# Patient Record
Sex: FEMALE | Race: WHITE | NOT HISPANIC OR LATINO | Employment: PART TIME | URBAN - NONMETROPOLITAN AREA
[De-identification: names, ages, dates, MRNs, and addresses within clinical notes are randomized per-mention and may not be internally consistent; named-entity substitution may affect disease eponyms.]

---
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
## 2019-01-22 ENCOUNTER — HOSPITAL ENCOUNTER (EMERGENCY)
Facility: HOSPITAL | Age: 19
Discharge: HOME | End: 2019-01-22
Attending: EMERGENCY MEDICINE | Admitting: EMERGENCY MEDICINE

## 2019-01-22 ENCOUNTER — APPOINTMENT (OUTPATIENT)
Dept: CT IMAGING | Facility: HOSPITAL | Age: 19
End: 2019-01-22

## 2019-01-22 VITALS
HEART RATE: 84 BPM | WEIGHT: 280 LBS | RESPIRATION RATE: 18 BRPM | OXYGEN SATURATION: 100 % | HEIGHT: 72 IN | TEMPERATURE: 98.2 F | DIASTOLIC BLOOD PRESSURE: 87 MMHG | SYSTOLIC BLOOD PRESSURE: 112 MMHG | BODY MASS INDEX: 37.93 KG/M2

## 2019-01-22 DIAGNOSIS — R51.9 ACUTE NONINTRACTABLE HEADACHE, UNSPECIFIED HEADACHE TYPE: Primary | ICD-10-CM

## 2019-01-22 LAB
ALBUMIN SERPL-MCNC: 4.6 G/DL (ref 3.5–5)
ALBUMIN/GLOB SERPL: 1.4 G/DL (ref 1.5–2.5)
ALP SERPL-CCNC: 101 U/L (ref 35–104)
ALT SERPL W P-5'-P-CCNC: 37 U/L (ref 10–36)
ANION GAP SERPL CALCULATED.3IONS-SCNC: 10.8 MMOL/L (ref 3.6–11.2)
AST SERPL-CCNC: 25 U/L (ref 10–30)
BASOPHILS # BLD AUTO: 0.05 10*3/MM3 (ref 0–0.3)
BASOPHILS NFR BLD AUTO: 0.3 % (ref 0–2)
BILIRUB SERPL-MCNC: 0.2 MG/DL (ref 0.2–1.8)
BUN BLD-MCNC: 19 MG/DL (ref 7–21)
BUN/CREAT SERPL: 25.3 (ref 7–25)
CALCIUM SPEC-SCNC: 9.7 MG/DL (ref 7.7–10)
CHLORIDE SERPL-SCNC: 107 MMOL/L (ref 99–112)
CO2 SERPL-SCNC: 21.2 MMOL/L (ref 24.3–31.9)
CREAT BLD-MCNC: 0.75 MG/DL (ref 0.43–1.29)
DEPRECATED RDW RBC AUTO: 42.5 FL (ref 37–54)
EOSINOPHIL # BLD AUTO: 0.1 10*3/MM3 (ref 0–0.7)
EOSINOPHIL NFR BLD AUTO: 0.7 % (ref 0–5)
ERYTHROCYTE [DISTWIDTH] IN BLOOD BY AUTOMATED COUNT: 13.5 % (ref 11.5–14.5)
GFR SERPL CREATININE-BSD FRML MDRD: 99 ML/MIN/1.73
GLOBULIN UR ELPH-MCNC: 3.2 GM/DL
GLUCOSE BLD-MCNC: 77 MG/DL (ref 70–110)
HCG SERPL QL: NEGATIVE
HCT VFR BLD AUTO: 45.1 % (ref 37–47)
HGB BLD-MCNC: 15.3 G/DL (ref 12–16)
IMM GRANULOCYTES # BLD AUTO: 0.07 10*3/MM3 (ref 0–0.03)
IMM GRANULOCYTES NFR BLD AUTO: 0.5 % (ref 0–0.5)
LYMPHOCYTES # BLD AUTO: 6.05 10*3/MM3 (ref 1–3)
LYMPHOCYTES NFR BLD AUTO: 42.2 % (ref 21–51)
MCH RBC QN AUTO: 29.6 PG (ref 27–33)
MCHC RBC AUTO-ENTMCNC: 33.9 G/DL (ref 33–37)
MCV RBC AUTO: 87.2 FL (ref 80–94)
MONOCYTES # BLD AUTO: 1.07 10*3/MM3 (ref 0.1–0.9)
MONOCYTES NFR BLD AUTO: 7.5 % (ref 0–10)
NEUTROPHILS # BLD AUTO: 6.99 10*3/MM3 (ref 1.4–6.5)
NEUTROPHILS NFR BLD AUTO: 48.8 % (ref 30–70)
OSMOLALITY SERPL CALC.SUM OF ELEC: 278.6 MOSM/KG (ref 273–305)
PLATELET # BLD AUTO: 341 10*3/MM3 (ref 130–400)
PMV BLD AUTO: 11.5 FL (ref 6–10)
POTASSIUM BLD-SCNC: 3.5 MMOL/L (ref 3.5–5.3)
PROT SERPL-MCNC: 7.8 G/DL (ref 6–8)
RBC # BLD AUTO: 5.17 10*6/MM3 (ref 4.2–5.4)
SODIUM BLD-SCNC: 139 MMOL/L (ref 135–153)
WBC NRBC COR # BLD: 14.33 10*3/MM3 (ref 4.5–12.5)

## 2019-01-22 PROCEDURE — 80053 COMPREHEN METABOLIC PANEL: CPT | Performed by: NURSE PRACTITIONER

## 2019-01-22 PROCEDURE — 70450 CT HEAD/BRAIN W/O DYE: CPT

## 2019-01-22 PROCEDURE — 84703 CHORIONIC GONADOTROPIN ASSAY: CPT | Performed by: NURSE PRACTITIONER

## 2019-01-22 PROCEDURE — 25010000002 KETOROLAC TROMETHAMINE PER 15 MG: Performed by: PHYSICIAN ASSISTANT

## 2019-01-22 PROCEDURE — 25010000002 METOCLOPRAMIDE PER 10 MG: Performed by: PHYSICIAN ASSISTANT

## 2019-01-22 PROCEDURE — 70450 CT HEAD/BRAIN W/O DYE: CPT | Performed by: RADIOLOGY

## 2019-01-22 PROCEDURE — 96375 TX/PRO/DX INJ NEW DRUG ADDON: CPT

## 2019-01-22 PROCEDURE — 96374 THER/PROPH/DIAG INJ IV PUSH: CPT

## 2019-01-22 PROCEDURE — 85025 COMPLETE CBC W/AUTO DIFF WBC: CPT | Performed by: NURSE PRACTITIONER

## 2019-01-22 PROCEDURE — 96361 HYDRATE IV INFUSION ADD-ON: CPT

## 2019-01-22 PROCEDURE — 99283 EMERGENCY DEPT VISIT LOW MDM: CPT

## 2019-01-22 RX ORDER — KETOROLAC TROMETHAMINE 30 MG/ML
30 INJECTION, SOLUTION INTRAMUSCULAR; INTRAVENOUS ONCE
Status: COMPLETED | OUTPATIENT
Start: 2019-01-22 | End: 2019-01-22

## 2019-01-22 RX ORDER — METAXALONE 800 MG/1
800 TABLET ORAL 3 TIMES DAILY PRN
Qty: 15 TABLET | Refills: 0 | Status: SHIPPED | OUTPATIENT
Start: 2019-01-22 | End: 2022-12-21

## 2019-01-22 RX ORDER — SODIUM CHLORIDE 0.9 % (FLUSH) 0.9 %
10 SYRINGE (ML) INJECTION AS NEEDED
Status: DISCONTINUED | OUTPATIENT
Start: 2019-01-22 | End: 2019-01-22 | Stop reason: HOSPADM

## 2019-01-22 RX ORDER — METOCLOPRAMIDE HYDROCHLORIDE 5 MG/ML
10 INJECTION INTRAMUSCULAR; INTRAVENOUS ONCE
Status: COMPLETED | OUTPATIENT
Start: 2019-01-22 | End: 2019-01-22

## 2019-01-22 RX ORDER — IBUPROFEN 800 MG/1
800 TABLET ORAL EVERY 6 HOURS PRN
Qty: 30 TABLET | Refills: 0 | Status: SHIPPED | OUTPATIENT
Start: 2019-01-22 | End: 2022-12-21

## 2019-01-22 RX ORDER — ORPHENADRINE CITRATE 30 MG/ML
60 INJECTION INTRAMUSCULAR; INTRAVENOUS ONCE
Status: DISCONTINUED | OUTPATIENT
Start: 2019-01-22 | End: 2019-01-22 | Stop reason: HOSPADM

## 2019-01-22 RX ORDER — ACETAMINOPHEN 500 MG
1000 TABLET ORAL ONCE
Status: COMPLETED | OUTPATIENT
Start: 2019-01-22 | End: 2019-01-22

## 2019-01-22 RX ADMIN — METOCLOPRAMIDE 10 MG: 5 INJECTION, SOLUTION INTRAMUSCULAR; INTRAVENOUS at 04:01

## 2019-01-22 RX ADMIN — SODIUM CHLORIDE 1000 ML: 9 INJECTION, SOLUTION INTRAVENOUS at 01:36

## 2019-01-22 RX ADMIN — SODIUM CHLORIDE 1000 ML: 9 INJECTION, SOLUTION INTRAVENOUS at 03:58

## 2019-01-22 RX ADMIN — ACETAMINOPHEN 1000 MG: 500 TABLET ORAL at 02:37

## 2019-01-22 RX ADMIN — KETOROLAC TROMETHAMINE 30 MG: 30 INJECTION, SOLUTION INTRAMUSCULAR; INTRAVENOUS at 04:00
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
## 2019-09-18 ENCOUNTER — HOSPITAL ENCOUNTER (EMERGENCY)
Facility: HOSPITAL | Age: 19
Discharge: HOME | End: 2019-09-18
Admitting: FAMILY MEDICINE

## 2019-09-18 ENCOUNTER — APPOINTMENT (OUTPATIENT)
Dept: CT IMAGING | Facility: HOSPITAL | Age: 19
End: 2019-09-18

## 2019-09-18 VITALS
RESPIRATION RATE: 17 BRPM | HEIGHT: 72 IN | OXYGEN SATURATION: 98 % | TEMPERATURE: 98.2 F | DIASTOLIC BLOOD PRESSURE: 88 MMHG | SYSTOLIC BLOOD PRESSURE: 151 MMHG | BODY MASS INDEX: 39.68 KG/M2 | HEART RATE: 82 BPM | WEIGHT: 293 LBS

## 2019-09-18 DIAGNOSIS — N93.9 ABNORMAL UTERINE BLEEDING: Primary | ICD-10-CM

## 2019-09-18 LAB
ALBUMIN SERPL-MCNC: 4.32 G/DL (ref 3.5–5.2)
ALBUMIN/GLOB SERPL: 1.2 G/DL
ALP SERPL-CCNC: 85 U/L (ref 39–117)
ALT SERPL W P-5'-P-CCNC: 29 U/L (ref 1–33)
ANION GAP SERPL CALCULATED.3IONS-SCNC: 12.1 MMOL/L (ref 5–15)
AST SERPL-CCNC: 26 U/L (ref 1–32)
BACTERIA UR QL AUTO: ABNORMAL /HPF
BASOPHILS # BLD AUTO: 0.02 10*3/MM3 (ref 0–0.2)
BASOPHILS NFR BLD AUTO: 0.4 % (ref 0–1.5)
BILIRUB SERPL-MCNC: 0.3 MG/DL (ref 0.2–1.2)
BILIRUB UR QL STRIP: NEGATIVE
BUN BLD-MCNC: 12 MG/DL (ref 6–20)
BUN/CREAT SERPL: 17.1 (ref 7–25)
CALCIUM SPEC-SCNC: 9.3 MG/DL (ref 8.6–10.5)
CHLORIDE SERPL-SCNC: 107 MMOL/L (ref 98–107)
CLARITY UR: ABNORMAL
CO2 SERPL-SCNC: 22.9 MMOL/L (ref 22–29)
COLOR UR: YELLOW
CREAT BLD-MCNC: 0.7 MG/DL (ref 0.57–1)
DEPRECATED RDW RBC AUTO: 43.1 FL (ref 37–54)
EOSINOPHIL # BLD AUTO: 0.07 10*3/MM3 (ref 0–0.4)
EOSINOPHIL NFR BLD AUTO: 1.3 % (ref 0.3–6.2)
ERYTHROCYTE [DISTWIDTH] IN BLOOD BY AUTOMATED COUNT: 13.5 % (ref 12.3–15.4)
GFR SERPL CREATININE-BSD FRML MDRD: 107 ML/MIN/1.73
GLOBULIN UR ELPH-MCNC: 3.5 GM/DL
GLUCOSE BLD-MCNC: 86 MG/DL (ref 65–99)
GLUCOSE UR STRIP-MCNC: NEGATIVE MG/DL
HCG SERPL QL: NEGATIVE
HCT VFR BLD AUTO: 43.2 % (ref 34–46.6)
HGB BLD-MCNC: 14 G/DL (ref 12–15.9)
HGB UR QL STRIP.AUTO: ABNORMAL
HOLD SPECIMEN: NORMAL
HOLD SPECIMEN: NORMAL
HYALINE CASTS UR QL AUTO: ABNORMAL /LPF
IMM GRANULOCYTES # BLD AUTO: 0.01 10*3/MM3 (ref 0–0.05)
IMM GRANULOCYTES NFR BLD AUTO: 0.2 % (ref 0–0.5)
KETONES UR QL STRIP: NEGATIVE
LEUKOCYTE ESTERASE UR QL STRIP.AUTO: ABNORMAL
LIPASE SERPL-CCNC: 50 U/L (ref 13–60)
LYMPHOCYTES # BLD AUTO: 2.26 10*3/MM3 (ref 0.7–3.1)
LYMPHOCYTES NFR BLD AUTO: 41.6 % (ref 19.6–45.3)
MCH RBC QN AUTO: 28.5 PG (ref 26.6–33)
MCHC RBC AUTO-ENTMCNC: 32.4 G/DL (ref 31.5–35.7)
MCV RBC AUTO: 88 FL (ref 79–97)
MONOCYTES # BLD AUTO: 0.44 10*3/MM3 (ref 0.1–0.9)
MONOCYTES NFR BLD AUTO: 8.1 % (ref 5–12)
NEUTROPHILS # BLD AUTO: 2.63 10*3/MM3 (ref 1.7–7)
NEUTROPHILS NFR BLD AUTO: 48.4 % (ref 42.7–76)
NITRITE UR QL STRIP: NEGATIVE
PH UR STRIP.AUTO: <=5 [PH] (ref 5–8)
PLATELET # BLD AUTO: 264 10*3/MM3 (ref 140–450)
PMV BLD AUTO: 11.8 FL (ref 6–12)
POTASSIUM BLD-SCNC: 3.6 MMOL/L (ref 3.5–5.2)
PROT SERPL-MCNC: 7.8 G/DL (ref 6–8.5)
PROT UR QL STRIP: NEGATIVE
RBC # BLD AUTO: 4.91 10*6/MM3 (ref 3.77–5.28)
RBC # UR: ABNORMAL /HPF
REF LAB TEST METHOD: ABNORMAL
SODIUM BLD-SCNC: 142 MMOL/L (ref 136–145)
SP GR UR STRIP: >1.03 (ref 1–1.03)
SQUAMOUS #/AREA URNS HPF: ABNORMAL /HPF
UROBILINOGEN UR QL STRIP: ABNORMAL
WBC NRBC COR # BLD: 5.43 10*3/MM3 (ref 3.4–10.8)
WBC UR QL AUTO: ABNORMAL /HPF
WHOLE BLOOD HOLD SPECIMEN: NORMAL
WHOLE BLOOD HOLD SPECIMEN: NORMAL

## 2019-09-18 PROCEDURE — 84703 CHORIONIC GONADOTROPIN ASSAY: CPT | Performed by: EMERGENCY MEDICINE

## 2019-09-18 PROCEDURE — 81001 URINALYSIS AUTO W/SCOPE: CPT | Performed by: EMERGENCY MEDICINE

## 2019-09-18 PROCEDURE — 85025 COMPLETE CBC W/AUTO DIFF WBC: CPT | Performed by: EMERGENCY MEDICINE

## 2019-09-18 PROCEDURE — 99284 EMERGENCY DEPT VISIT MOD MDM: CPT

## 2019-09-18 PROCEDURE — 74176 CT ABD & PELVIS W/O CONTRAST: CPT | Performed by: RADIOLOGY

## 2019-09-18 PROCEDURE — 83690 ASSAY OF LIPASE: CPT | Performed by: EMERGENCY MEDICINE

## 2019-09-18 PROCEDURE — 80053 COMPREHEN METABOLIC PANEL: CPT | Performed by: EMERGENCY MEDICINE

## 2019-09-18 PROCEDURE — 74176 CT ABD & PELVIS W/O CONTRAST: CPT

## 2019-09-18 RX ORDER — SODIUM CHLORIDE 0.9 % (FLUSH) 0.9 %
10 SYRINGE (ML) INJECTION AS NEEDED
Status: DISCONTINUED | OUTPATIENT
Start: 2019-09-18 | End: 2019-09-19 | Stop reason: HOSPADM

## 2019-09-19 NOTE — ED PROVIDER NOTES
Subjective     Vaginal Bleeding   Quality:  Bright red  Severity:  Mild  Onset quality:  Sudden  Duration:  1 day  Timing:  Intermittent  Progression:  Improving  Chronicity:  New  Menstrual history:  Irregular  Possible pregnancy: no    Context: during urination    Relieved by:  None tried  Worsened by:  Nothing  Ineffective treatments:  None tried  Associated symptoms: abdominal pain    Associated symptoms: no dysuria, no fever and no nausea        Review of Systems   Constitutional: Negative for chills and fever.   HENT: Negative for congestion, ear pain and sore throat.    Respiratory: Negative for cough, shortness of breath and wheezing.    Cardiovascular: Negative for chest pain.   Gastrointestinal: Positive for abdominal pain. Negative for diarrhea, nausea and vomiting.   Genitourinary: Positive for vaginal bleeding. Negative for dysuria and flank pain.   Skin: Negative for rash.   Neurological: Negative for headaches.   Psychiatric/Behavioral: The patient is not nervous/anxious.    All other systems reviewed and are negative.      Past Medical History:   Diagnosis Date   • Kidney stones        Allergies   Allergen Reactions   • Augmentin [Amoxicillin-Pot Clavulanate]        Past Surgical History:   Procedure Laterality Date   • CYST REMOVAL      TAILBONE   • TONSILLECTOMY     • TYMPANOSTOMY TUBE PLACEMENT     • WISDOM TOOTH EXTRACTION         History reviewed. No pertinent family history.    Social History     Socioeconomic History   • Marital status: Single     Spouse name: Not on file   • Number of children: Not on file   • Years of education: Not on file   • Highest education level: Not on file   Tobacco Use   • Smoking status: Never Smoker   Substance and Sexual Activity   • Alcohol use: No   • Drug use: No   • Sexual activity: Defer           Objective   Physical Exam   Constitutional: She is oriented to person, place, and time. She appears well-developed and well-nourished.   HENT:   Head:  Normocephalic.   Mouth/Throat: Oropharynx is clear and moist.   Neck: Neck supple.   Cardiovascular: Normal rate and regular rhythm.   Pulmonary/Chest: Effort normal and breath sounds normal.   Abdominal: Soft. Bowel sounds are normal. There is no tenderness.   Genitourinary: There is no rash or tenderness on the right labia. There is no rash or tenderness on the left labia. Uterus is not tender. Cervix exhibits friability. Right adnexum displays no tenderness. Left adnexum displays no tenderness. No bleeding in the vagina.   Musculoskeletal: Normal range of motion.   Neurological: She is alert and oriented to person, place, and time.   Skin: Skin is warm and dry.   Psychiatric: She has a normal mood and affect. Her behavior is normal. Judgment and thought content normal.   Nursing note and vitals reviewed.      Procedures           ED Course                  MDM    Final diagnoses:   Abnormal uterine bleeding              Ivana Arita PA  09/24/19 0434

## 2019-11-11 ENCOUNTER — APPOINTMENT (OUTPATIENT)
Dept: CT IMAGING | Facility: HOSPITAL | Age: 19
End: 2019-11-11

## 2019-11-11 ENCOUNTER — HOSPITAL ENCOUNTER (EMERGENCY)
Facility: HOSPITAL | Age: 19
Discharge: HOME | End: 2019-11-11
Attending: FAMILY MEDICINE | Admitting: FAMILY MEDICINE

## 2019-11-11 VITALS
DIASTOLIC BLOOD PRESSURE: 79 MMHG | WEIGHT: 293 LBS | HEART RATE: 89 BPM | BODY MASS INDEX: 39.68 KG/M2 | OXYGEN SATURATION: 99 % | SYSTOLIC BLOOD PRESSURE: 141 MMHG | HEIGHT: 72 IN | TEMPERATURE: 98.1 F | RESPIRATION RATE: 17 BRPM

## 2019-11-11 DIAGNOSIS — N30.01 ACUTE CYSTITIS WITH HEMATURIA: Primary | ICD-10-CM

## 2019-11-11 LAB
ALBUMIN SERPL-MCNC: 4.34 G/DL (ref 3.5–5.2)
ALBUMIN/GLOB SERPL: 1.3 G/DL
ALP SERPL-CCNC: 92 U/L (ref 39–117)
ALT SERPL W P-5'-P-CCNC: 21 U/L (ref 1–33)
ANION GAP SERPL CALCULATED.3IONS-SCNC: 10.7 MMOL/L (ref 5–15)
AST SERPL-CCNC: 20 U/L (ref 1–32)
B-HCG UR QL: NEGATIVE
BACTERIA UR QL AUTO: ABNORMAL /HPF
BASOPHILS # BLD AUTO: 0.04 10*3/MM3 (ref 0–0.2)
BASOPHILS NFR BLD AUTO: 0.5 % (ref 0–1.5)
BILIRUB SERPL-MCNC: 0.3 MG/DL (ref 0.2–1.2)
BILIRUB UR QL STRIP: NEGATIVE
BUN BLD-MCNC: 11 MG/DL (ref 6–20)
BUN/CREAT SERPL: 14.5 (ref 7–25)
CALCIUM SPEC-SCNC: 9.6 MG/DL (ref 8.6–10.5)
CHLORIDE SERPL-SCNC: 105 MMOL/L (ref 98–107)
CLARITY UR: ABNORMAL
CO2 SERPL-SCNC: 26.3 MMOL/L (ref 22–29)
COLOR UR: YELLOW
CREAT BLD-MCNC: 0.76 MG/DL (ref 0.57–1)
DEPRECATED RDW RBC AUTO: 41.2 FL (ref 37–54)
DEVELOPER EXPIRATION DATE: NORMAL
DEVELOPER LOT NUMBER: NORMAL
EOSINOPHIL # BLD AUTO: 0.1 10*3/MM3 (ref 0–0.4)
EOSINOPHIL NFR BLD AUTO: 1.3 % (ref 0.3–6.2)
ERYTHROCYTE [DISTWIDTH] IN BLOOD BY AUTOMATED COUNT: 13.1 % (ref 12.3–15.4)
EXPIRATION DATE: NORMAL
FECAL OCCULT BLOOD SCREEN, POC: NEGATIVE
GFR SERPL CREATININE-BSD FRML MDRD: 96 ML/MIN/1.73
GLOBULIN UR ELPH-MCNC: 3.5 GM/DL
GLUCOSE BLD-MCNC: 81 MG/DL (ref 65–99)
GLUCOSE UR STRIP-MCNC: NEGATIVE MG/DL
HCT VFR BLD AUTO: 43.7 % (ref 34–46.6)
HGB BLD-MCNC: 14.4 G/DL (ref 12–15.9)
HGB UR QL STRIP.AUTO: ABNORMAL
HOLD SPECIMEN: NORMAL
HOLD SPECIMEN: NORMAL
HYALINE CASTS UR QL AUTO: ABNORMAL /LPF
IMM GRANULOCYTES # BLD AUTO: 0.02 10*3/MM3 (ref 0–0.05)
IMM GRANULOCYTES NFR BLD AUTO: 0.3 % (ref 0–0.5)
KETONES UR QL STRIP: ABNORMAL
LEUKOCYTE ESTERASE UR QL STRIP.AUTO: ABNORMAL
LYMPHOCYTES # BLD AUTO: 2.32 10*3/MM3 (ref 0.7–3.1)
LYMPHOCYTES NFR BLD AUTO: 30.6 % (ref 19.6–45.3)
Lab: NORMAL
MCH RBC QN AUTO: 28.7 PG (ref 26.6–33)
MCHC RBC AUTO-ENTMCNC: 33 G/DL (ref 31.5–35.7)
MCV RBC AUTO: 87.1 FL (ref 79–97)
MONOCYTES # BLD AUTO: 0.6 10*3/MM3 (ref 0.1–0.9)
MONOCYTES NFR BLD AUTO: 7.9 % (ref 5–12)
NEGATIVE CONTROL: NEGATIVE
NEUTROPHILS # BLD AUTO: 4.51 10*3/MM3 (ref 1.7–7)
NEUTROPHILS NFR BLD AUTO: 59.4 % (ref 42.7–76)
NITRITE UR QL STRIP: NEGATIVE
NRBC BLD AUTO-RTO: 0 /100 WBC (ref 0–0.2)
PH UR STRIP.AUTO: <=5 [PH] (ref 5–8)
PLATELET # BLD AUTO: 302 10*3/MM3 (ref 140–450)
PMV BLD AUTO: 11.2 FL (ref 6–12)
POSITIVE CONTROL: POSITIVE
POTASSIUM BLD-SCNC: 3.8 MMOL/L (ref 3.5–5.2)
PROT SERPL-MCNC: 7.8 G/DL (ref 6–8.5)
PROT UR QL STRIP: NEGATIVE
RBC # BLD AUTO: 5.02 10*6/MM3 (ref 3.77–5.28)
RBC # UR: ABNORMAL /HPF
REF LAB TEST METHOD: ABNORMAL
SODIUM BLD-SCNC: 142 MMOL/L (ref 136–145)
SP GR UR STRIP: 1.03 (ref 1–1.03)
SQUAMOUS #/AREA URNS HPF: ABNORMAL /HPF
UROBILINOGEN UR QL STRIP: ABNORMAL
WBC NRBC COR # BLD: 7.59 10*3/MM3 (ref 3.4–10.8)
WBC UR QL AUTO: ABNORMAL /HPF
WHOLE BLOOD HOLD SPECIMEN: NORMAL
WHOLE BLOOD HOLD SPECIMEN: NORMAL

## 2019-11-11 PROCEDURE — 0 IOVERSOL 68 % SOLUTION: Performed by: FAMILY MEDICINE

## 2019-11-11 PROCEDURE — 25010000002 CEFTRIAXONE: Performed by: FAMILY MEDICINE

## 2019-11-11 PROCEDURE — 96365 THER/PROPH/DIAG IV INF INIT: CPT

## 2019-11-11 PROCEDURE — 82270 OCCULT BLOOD FECES: CPT | Performed by: FAMILY MEDICINE

## 2019-11-11 PROCEDURE — 85025 COMPLETE CBC W/AUTO DIFF WBC: CPT | Performed by: FAMILY MEDICINE

## 2019-11-11 PROCEDURE — 80053 COMPREHEN METABOLIC PANEL: CPT | Performed by: FAMILY MEDICINE

## 2019-11-11 PROCEDURE — 74177 CT ABD & PELVIS W/CONTRAST: CPT | Performed by: RADIOLOGY

## 2019-11-11 PROCEDURE — 74177 CT ABD & PELVIS W/CONTRAST: CPT

## 2019-11-11 PROCEDURE — 81025 URINE PREGNANCY TEST: CPT | Performed by: FAMILY MEDICINE

## 2019-11-11 PROCEDURE — 99284 EMERGENCY DEPT VISIT MOD MDM: CPT

## 2019-11-11 PROCEDURE — 81001 URINALYSIS AUTO W/SCOPE: CPT | Performed by: FAMILY MEDICINE

## 2019-11-11 RX ORDER — ONDANSETRON 4 MG/1
4 TABLET, ORALLY DISINTEGRATING ORAL EVERY 6 HOURS PRN
Qty: 12 TABLET | Refills: 0 | Status: SHIPPED | OUTPATIENT
Start: 2019-11-11 | End: 2022-12-21

## 2019-11-11 RX ORDER — SODIUM CHLORIDE 0.9 % (FLUSH) 0.9 %
10 SYRINGE (ML) INJECTION AS NEEDED
Status: DISCONTINUED | OUTPATIENT
Start: 2019-11-11 | End: 2019-11-12 | Stop reason: HOSPADM

## 2019-11-11 RX ORDER — CEFDINIR 300 MG/1
300 CAPSULE ORAL 2 TIMES DAILY
Qty: 14 CAPSULE | Refills: 0 | Status: SHIPPED | OUTPATIENT
Start: 2019-11-11 | End: 2022-12-21

## 2019-11-11 RX ADMIN — CEFTRIAXONE 1 G: 1 INJECTION, POWDER, FOR SOLUTION INTRAMUSCULAR; INTRAVENOUS at 20:20

## 2019-11-11 RX ADMIN — SODIUM CHLORIDE 500 ML: 9 INJECTION, SOLUTION INTRAVENOUS at 21:15

## 2019-11-11 RX ADMIN — IOVERSOL 94 ML: 678 INJECTION INTRA-ARTERIAL; INTRAVENOUS at 21:43

## 2019-11-12 NOTE — ED PROVIDER NOTES
Subjective   19-year-old female presents the emergency room with complaints of abdominal pain that began yesterday.  Patient states that her pain is located in her lower abdomen and radiates into her back.  She states that she is felt pressure.  She states that she has noticed blood she feels it was in her urine.  She states though that she did start to have dark-colored stool.  She is unsure if if she is having blood in her urine or if she is having blood in her stool.  States her last bowel movement was just prior to arrival.  She denies diarrhea.  Denies any recent trauma.  She states that she has had no nausea or vomiting.  She denies fever chills.  She denies chest pain or shortness of breath.  She denies vaginal bleeding.  Due to symptoms patient came to emergency room for evaluation        Abdominal Pain   Pain location:  LLQ and RLQ  Pain quality: aching and cramping    Pain severity:  Moderate  Timing:  Intermittent  Relieved by:  Nothing  Worsened by:  Nothing  Associated symptoms: no belching, no chest pain, no chills, no constipation, no cough, no diarrhea, no fever, no flatus, no nausea, no shortness of breath and no vomiting        Review of Systems   Constitutional: Negative for chills and fever.   Respiratory: Negative for cough and shortness of breath.    Cardiovascular: Negative for chest pain.   Gastrointestinal: Positive for abdominal pain. Negative for constipation, diarrhea, flatus, nausea and vomiting.   All other systems reviewed and are negative.      Past Medical History:   Diagnosis Date   • Kidney stones        Allergies   Allergen Reactions   • Augmentin [Amoxicillin-Pot Clavulanate]        Past Surgical History:   Procedure Laterality Date   • CYST REMOVAL      TAILBONE   • TONSILLECTOMY     • TYMPANOSTOMY TUBE PLACEMENT     • WISDOM TOOTH EXTRACTION         History reviewed. No pertinent family history.    Social History     Socioeconomic History   • Marital status: Single     Spouse  name: Not on file   • Number of children: Not on file   • Years of education: Not on file   • Highest education level: Not on file   Tobacco Use   • Smoking status: Never Smoker   • Smokeless tobacco: Never Used   Substance and Sexual Activity   • Alcohol use: No   • Drug use: No   • Sexual activity: Yes     Partners: Male     Birth control/protection: None           Objective   Physical Exam   Constitutional: She is oriented to person, place, and time.   HENT:   Head: Normocephalic and atraumatic.   Mouth/Throat: Oropharynx is clear and moist.   Eyes: EOM are normal. Pupils are equal, round, and reactive to light. No scleral icterus.   Cardiovascular: Normal rate and regular rhythm.   No murmur heard.  2+ radial pulses 2+ dorsalis pedis pulse   Pulmonary/Chest: Effort normal and breath sounds normal. She has no wheezes. She has no rhonchi. She has no rales.   Abdominal: Soft. Bowel sounds are normal. There is no tenderness. There is no rigidity, no rebound, no guarding and no CVA tenderness.   Unable to appreciate organomegaly due to body habitus.   Genitourinary: Rectal exam shows no mass, no tenderness and guaiac negative stool.   Neurological: She is alert and oriented to person, place, and time.   No focal neurological deficits   Skin: Skin is warm and dry. Capillary refill takes less than 2 seconds.   No petechiae no bruising.   Psychiatric: She has a normal mood and affect.   Nursing note and vitals reviewed.      Procedures           ED Course  ED Course as of Nov 11 2157   Mon Nov 11, 2019 1952 Patient white blood cell count is unremarkable.  Patient letter lites are unremarkable.  Patient Hemoccult is negative.  Patient is noted to have small amount of leukocyte Estrace as well as 31-50 white blood cells as well as 2+ bacteria in urine.  Have ordered IV Rocephin.  Will obtain CT scan of abdomen pelvis with contrast.  [BB]   2112 Patient with slight elevation in heart rate with sitting to standing however  otherwise is unremarkable.  Orthostatics are unremarkable.  [BB]   3424 Patient currently stable.  Patient is noted to have UTI on urinalysis.  Have discussed her on her buttocks and need for follow-up with primary care provider.  Discussed warning signs of joint emergency room.  Patient is afebrile and nontoxic.  Prior to discharge.  Patient verbalized understanding.  [BB]      ED Course User Index  [BB] Timur Winn MD                  MDM  Number of Diagnoses or Management Options  Acute cystitis with hematuria: minor     Amount and/or Complexity of Data Reviewed  Clinical lab tests: ordered and reviewed  Tests in the radiology section of CPT®: ordered and reviewed    Risk of Complications, Morbidity, and/or Mortality  Presenting problems: moderate  Diagnostic procedures: moderate  Management options: moderate    Patient Progress  Patient progress: stable      Final diagnoses:   Acute cystitis with hematuria              Timur Winn MD  11/11/19 5603

## 2020-05-29 ENCOUNTER — TRANSCRIBE ORDERS (OUTPATIENT)
Dept: ADMINISTRATIVE | Facility: HOSPITAL | Age: 20
End: 2020-05-29

## 2020-05-29 DIAGNOSIS — Z01.818 PRE-OPERATIVE CLEARANCE: Primary | ICD-10-CM

## 2020-06-01 ENCOUNTER — LAB (OUTPATIENT)
Dept: LAB | Facility: HOSPITAL | Age: 20
End: 2020-06-01

## 2020-06-01 DIAGNOSIS — Z01.818 PRE-OPERATIVE CLEARANCE: ICD-10-CM

## 2020-06-01 LAB
REF LAB TEST METHOD: NORMAL
SARS-COV-2 RNA RESP QL NAA+PROBE: NOT DETECTED

## 2022-12-21 ENCOUNTER — CONSULT (OUTPATIENT)
Dept: ONCOLOGY | Facility: CLINIC | Age: 22
End: 2022-12-21

## 2022-12-21 VITALS
RESPIRATION RATE: 18 BRPM | HEIGHT: 68 IN | SYSTOLIC BLOOD PRESSURE: 135 MMHG | TEMPERATURE: 97.3 F | DIASTOLIC BLOOD PRESSURE: 89 MMHG | OXYGEN SATURATION: 98 % | HEART RATE: 88 BPM | WEIGHT: 293 LBS | BODY MASS INDEX: 44.41 KG/M2

## 2022-12-21 DIAGNOSIS — R79.89 ELEVATED FERRITIN LEVEL: Primary | ICD-10-CM

## 2022-12-21 LAB
BASOPHILS # BLD AUTO: 0.05 10*3/MM3 (ref 0–0.2)
BASOPHILS NFR BLD AUTO: 0.7 % (ref 0–1.5)
DEPRECATED RDW RBC AUTO: 43.3 FL (ref 37–54)
EOSINOPHIL # BLD AUTO: 0.11 10*3/MM3 (ref 0–0.4)
EOSINOPHIL NFR BLD AUTO: 1.6 % (ref 0.3–6.2)
ERYTHROCYTE [DISTWIDTH] IN BLOOD BY AUTOMATED COUNT: 13.1 % (ref 12.3–15.4)
FERRITIN SERPL-MCNC: 210.1 NG/ML (ref 13–150)
HCT VFR BLD AUTO: 42.9 % (ref 34–46.6)
HGB BLD-MCNC: 14 G/DL (ref 12–15.9)
IMM GRANULOCYTES # BLD AUTO: 0.02 10*3/MM3 (ref 0–0.05)
IMM GRANULOCYTES NFR BLD AUTO: 0.3 % (ref 0–0.5)
IRON 24H UR-MRATE: 57 MCG/DL (ref 37–145)
IRON SATN MFR SERPL: 14 % (ref 20–50)
LYMPHOCYTES # BLD AUTO: 2.38 10*3/MM3 (ref 0.7–3.1)
LYMPHOCYTES NFR BLD AUTO: 35.2 % (ref 19.6–45.3)
MCH RBC QN AUTO: 29.5 PG (ref 26.6–33)
MCHC RBC AUTO-ENTMCNC: 32.6 G/DL (ref 31.5–35.7)
MCV RBC AUTO: 90.5 FL (ref 79–97)
MONOCYTES # BLD AUTO: 0.43 10*3/MM3 (ref 0.1–0.9)
MONOCYTES NFR BLD AUTO: 6.4 % (ref 5–12)
NEUTROPHILS NFR BLD AUTO: 3.78 10*3/MM3 (ref 1.7–7)
NEUTROPHILS NFR BLD AUTO: 55.8 % (ref 42.7–76)
NRBC BLD AUTO-RTO: 0 /100 WBC (ref 0–0.2)
PLATELET # BLD AUTO: 267 10*3/MM3 (ref 140–450)
PMV BLD AUTO: 10.8 FL (ref 6–12)
RBC # BLD AUTO: 4.74 10*6/MM3 (ref 3.77–5.28)
TIBC SERPL-MCNC: 396 MCG/DL (ref 298–536)
TRANSFERRIN SERPL-MCNC: 266 MG/DL (ref 200–360)
WBC NRBC COR # BLD: 6.77 10*3/MM3 (ref 3.4–10.8)

## 2022-12-21 PROCEDURE — 81256 HFE GENE: CPT | Performed by: INTERNAL MEDICINE

## 2022-12-21 PROCEDURE — 85025 COMPLETE CBC W/AUTO DIFF WBC: CPT | Performed by: INTERNAL MEDICINE

## 2022-12-21 PROCEDURE — 82728 ASSAY OF FERRITIN: CPT | Performed by: INTERNAL MEDICINE

## 2022-12-21 PROCEDURE — 83540 ASSAY OF IRON: CPT | Performed by: INTERNAL MEDICINE

## 2022-12-21 PROCEDURE — 99205 OFFICE O/P NEW HI 60 MIN: CPT | Performed by: INTERNAL MEDICINE

## 2022-12-21 PROCEDURE — 84466 ASSAY OF TRANSFERRIN: CPT | Performed by: INTERNAL MEDICINE

## 2022-12-21 NOTE — PROGRESS NOTES
"      Subjective     Date: 12/21/2022    Referring Provider  Brad Stinson*    Chief Complaint  Hemochromatosis/elevated ferritin    Subjective          Angie Ding is a 22 y.o. female who presents today to [facility] for initial evaluation .    HPI:   22-year-old female with past medical history of endometriosis, polycystic ovarian syndrome who presents today due to persistently elevated.  She reports having elevated \"iron\" levels that were noted approximately a year ago, she was seen by a hematologist at that time and [place].  Repeat labs showed her iron level was normal, no further testing was done.  However, at her regular physicians appointments blood draws have shown persistently elevated ferritin level 165 on 3/2/2002 and 214 on 11/19/2002.     She denies ever having genetic testing for hemochromatosis.  No family history of blood disorders as far she is aware, no family history of malignancy.  She is not a diabetic, no history of heart failure, no history of liver dysfunction.  She does not take iron supplements, but does take prenatal vitamins daily.  Denies alcohol use.  Denies excessive consumption of red meats.    The following portions of the patient's history were reviewed and updated as appropriate: allergies, current medications, past family history, past medical history, past social history, past surgical history and problem list.    Objective     Objective     Allergy:   Allergies   Allergen Reactions   • Augmentin [Amoxicillin-Pot Clavulanate]         Current Medications:   Current Outpatient Medications   Medication Sig Dispense Refill   • clomiPHENE (CLOMID) 50 MG tablet      • metFORMIN (GLUCOPHAGE) 500 MG tablet metformin 500 mg tablet   Take 1 tablet 3 times a day by oral route with meals for 30 days.     • Prenatal MV-Min-Fe Fum-FA-DHA (PRENATAL 1 PO) Prenatal Formula-DHA 28 mg-800 mcg-200 mg capsule   Take 1 capsule every day " "by oral route as directed for 30 days.       No current facility-administered medications for this visit.       Past Medical History:  Past Medical History:   Diagnosis Date   • Kidney stones    • Ovarian cyst        Past Surgical History:  Past Surgical History:   Procedure Laterality Date   • CYST REMOVAL      TAILBONE   • TONSILLECTOMY     • TYMPANOSTOMY TUBE PLACEMENT     • WISDOM TOOTH EXTRACTION         Social History:  Social History     Socioeconomic History   • Marital status: Single   Tobacco Use   • Smoking status: Never   • Smokeless tobacco: Never   Substance and Sexual Activity   • Alcohol use: No   • Drug use: No   • Sexual activity: Yes     Partners: Male     Birth control/protection: None     Angie Ding  reports that she has never smoked. She has never used smokeless tobacco..      Family History:  History reviewed. No pertinent family history.    Review of Systems:  Review of Systems   Constitutional: Negative.    HENT: Negative.    Eyes: Negative.    Respiratory: Negative.    Cardiovascular: Negative.    Gastrointestinal: Negative.    Endocrine: Negative.    Genitourinary: Negative.    Musculoskeletal: Negative.    Skin: Negative.    Neurological: Negative.    Hematological: Negative.    Psychiatric/Behavioral: Negative.        Vital Signs:   /89   Pulse 88   Temp 97.3 °F (36.3 °C)   Resp 18   Ht 172.7 cm (68\")   Wt (!) 149 kg (328 lb)   SpO2 98%   BMI 49.87 kg/m²      Physical Exam:  Physical Exam  Constitutional:       Appearance: Normal appearance.   HENT:      Head: Normocephalic and atraumatic.      Mouth/Throat:      Mouth: Mucous membranes are moist.      Pharynx: Oropharynx is clear.   Eyes:      Extraocular Movements: Extraocular movements intact.      Pupils: Pupils are equal, round, and reactive to light.   Cardiovascular:      Rate and Rhythm: Normal rate and regular rhythm.      Heart sounds: No murmur heard.  Pulmonary:      Effort: Pulmonary effort is " "normal.      Breath sounds: Normal breath sounds.   Abdominal:      General: Abdomen is flat. There is no distension.      Palpations: Abdomen is soft. There is no mass.   Musculoskeletal:         General: No swelling. Normal range of motion.   Skin:     General: Skin is warm and dry.   Neurological:      General: No focal deficit present.      Mental Status: She is alert and oriented to person, place, and time.   Psychiatric:         Mood and Affect: Mood normal.         Behavior: Behavior normal.         PHQ-9 Score  PHQ-9 Total Score: 0     Pain Score  Vitals:    12/21/22 1102   BP: 135/89   Pulse: 88   Resp: 18   Temp: 97.3 °F (36.3 °C)   SpO2: 98%   Weight: (!) 149 kg (328 lb)   Height: 172.7 cm (68\")   PainSc: 0-No pain       ECOG score: 0             Lab Review  Lab Results   Component Value Date    WBC 6.77 12/21/2022    HGB 14.0 12/21/2022    HCT 42.9 12/21/2022    MCV 90.5 12/21/2022    RDW 13.1 12/21/2022     12/21/2022    NEUTRORELPCT 55.8 12/21/2022    LYMPHORELPCT 35.2 12/21/2022    MONORELPCT 6.4 12/21/2022    EOSRELPCT 1.6 12/21/2022    BASORELPCT 0.7 12/21/2022    NEUTROABS 3.78 12/21/2022    LYMPHSABS 2.38 12/21/2022       Lab Results   Component Value Date     11/11/2019    K 3.8 11/11/2019    CO2 26.3 11/11/2019     11/11/2019    BUN 11 11/11/2019    CREATININE 0.76 11/11/2019    EGFRIFNONA 96 11/11/2019    EGFRIFAFRI  06/17/2017      Comment:      Unable to calculate GFR, patient age <=18.    GLUCOSE 81 11/11/2019    CALCIUM 9.6 11/11/2019    ALKPHOS 92 11/11/2019    AST 20 11/11/2019    ALT 21 11/11/2019    BILITOT 0.3 11/11/2019    ALBUMIN 4.34 11/11/2019    PROTEINTOT 7.8 11/11/2019       Assessment / Plan         Assessment and Plan   22-year-old female who presents with persistently elevated ferritin level.    #Persistently elevated ferritin  -Unlikely to be due to hemochromatosis, primary or secondary.  However we will repeat CBC, iron studies, ferritin level, peripheral " smear  -Check hemochromatosis mutations  -Discussed avoiding alcohol use until we have all the studies to prove that she does not have a history of hemochromatosis.      Discussed possible differential diagnoses, testing, treatment, recommended non-pharmacological interventions, risks, warning signs to monitor for that would indicate need for follow-up in clinic or ER. If no improvement with these regimens or you have new or worsening symptoms follow-up. Patient verbalizes understanding and agreement with plan of care. Denies further needs or concerns.     Patient was given instructions and counseling regarding her condition and for health maintenance advised.    I spent 60 minutes with Angie Ding today.  In the office today, more than 50% of this time was spent face-to-face with her  in counseling / coordination of care, reviewing her interim medical history and counseling on the current treatment plan.  All questions were answered to her satisfaction.    BMI cannot be calculated due to outdated height or weight values.  Please input a current height/weight in Vitals and re-renter BMIFOLLOWUP in Note to pull in correct documentation based on BMI range.         Meds ordered during this visit  No orders of the defined types were placed in this encounter.      Visit Diagnoses    ICD-10-CM ICD-9-CM   1. Elevated ferritin level  R79.89 790.6       Follow Up   2 weeks      This document has been electronically signed by Abbie Vaughn MD   December 21, 2022 17:21 EST    Dictated Utilizing Dragon Dictation: Part of this note may be an electronic transcription/translation of spoken language to printed text using the Dragon Dictation System.

## 2022-12-22 LAB — REF LAB TEST METHOD: NORMAL

## 2022-12-28 LAB — HFE GENE MUT ANL BLD/T: NORMAL

## 2023-01-03 ENCOUNTER — PATIENT ROUNDING (BHMG ONLY) (OUTPATIENT)
Dept: ONCOLOGY | Facility: CLINIC | Age: 23
End: 2023-01-03
Payer: COMMERCIAL

## 2023-01-05 ENCOUNTER — TELEPHONE (OUTPATIENT)
Dept: ONCOLOGY | Facility: CLINIC | Age: 23
End: 2023-01-05
Payer: COMMERCIAL

## 2023-01-05 NOTE — TELEPHONE ENCOUNTER
Caller: YAS - [facility]    Relationship: Provider    Best call back number: [phone]    What is the best time to reach you: ASAP, AS SHE NEEDS INFO. TODAY.    Who are you requesting to speak with (clinical staff, provider,  specific staff member): DR. HOPSON'S NURSE    Do you know the name of the person who called:  YAS    What was the call regarding: YAS NEEDS SOME CLARIFICATION ON LAB RESULTS YOU ALL REQUESTED.    Do you require a callback: YES, PLEASE

## 2023-05-01 LAB
EXTERNAL ABO GROUPING: NORMAL
EXTERNAL ANTIBODY SCREEN: NEGATIVE
EXTERNAL HEPATITIS B SURFACE ANTIGEN: NEGATIVE
EXTERNAL RH FACTOR: POSITIVE
EXTERNAL RUBELLA QUALITATIVE: NORMAL
EXTERNAL SYPHILIS RPR SCREEN: NORMAL
HIV1 P24 AG SERPL QL IA: NORMAL

## 2023-08-03 ENCOUNTER — REFERRAL TRIAGE (OUTPATIENT)
Dept: LABOR AND DELIVERY | Facility: HOSPITAL | Age: 23
End: 2023-08-03
Payer: COMMERCIAL

## 2023-08-08 ENCOUNTER — PATIENT OUTREACH (OUTPATIENT)
Dept: LABOR AND DELIVERY | Facility: HOSPITAL | Age: 23
End: 2023-08-08
Payer: COMMERCIAL

## 2023-08-08 NOTE — OUTREACH NOTE
[facility]  Intake    Current Estimated Gestational Age: 26w4d    Intake Assessment      Flowsheet Row Responses   Best Method for Contacting Home   Currently Employed Yes   Able to keep appointments as scheduled Yes   Do you have a dentist? No   Resources Presently Utilizing: WIC (Women, Infant, Children)   Maternal Warning Signs Provided   Other: Provided   Other Education Smoking/Vaping Cessation, SNAP Benefits, HANDS, Housing Assistance, Substance Use Disorder Treatment, How to find a dentist, Transportation Assistance, How to find a pediatrician, WIC Benefits, How to find a primary care provider, Insurance benefits/Incentives, Mental Health Services            Learning Assessment      Flowsheet Row Responses   Relationship Patient   Does the learner have any barriers to learning? No Barriers   What is the preferred language of the learner for medical teaching? English   Is an  required? No   How does the learner prefer to learn new concepts? Listening, Reading                Referral submitted to the following resources (verbal consent received to submit demographic information):         Tobacco, Alcohol, and Drug History     reports that she has never smoked. She has never used smokeless tobacco.   reports no history of alcohol use.   reports no history of drug use.    Marissa Reynaga RN  Maternity Nurse Navigator    8/8/2023, 17:17 EDT        [facility]  Enrollment    Current Estimated Gestational Age: 26w4d    Questions/Answers      Flowsheet Row Responses   Would like to participate? Yes   Date of Intake Visit 08/08/23            Spoke with patient over the phone. Discussed community and insurance extra benefits. Pt reports she is enrolled in [place], plans to breast feed, and is having a boy.  Information for  sent by [employer] along with SDOH questionnaire. Pt denies any needs or concerns at this time.    Marissa Reynaga RN  Maternity Nurse Navigator    8/8/2023, 17:17  EDT

## 2023-08-08 NOTE — OUTREACH NOTE
[facility]  Unable to Reach       Questions/Answers      Flowsheet Row Responses   Pending Outreach Confirm Patient Interest   Call Attempt First   Outcome Left message   Next Call Attempt Date 08/10/23            Left message and sent MyChart welcome letter.    Marissa Reynaga RN  Maternity Nurse Navigator    8/8/2023, 16:41 EDT

## 2023-08-15 LAB
EXTERNAL CHLAMYDIA SCREEN: NEGATIVE
EXTERNAL GONORRHEA SCREEN: NEGATIVE

## 2023-09-21 ENCOUNTER — PATIENT OUTREACH (OUTPATIENT)
Dept: LABOR AND DELIVERY | Facility: HOSPITAL | Age: 23
End: 2023-09-21
Payer: COMMERCIAL

## 2023-09-21 NOTE — OUTREACH NOTE
[facility]  Unable to Reach       Questions/Answers      Flowsheet Row Responses   Pending Outreach Prenatal Check-in   Call Attempt First   Outcome Left message   Next Call Attempt Date 09/28/23            Left message with call back number.    Marissa Reynaga RN  Maternity Nurse Navigator    9/21/2023, 13:35 EDT

## 2023-09-28 ENCOUNTER — PATIENT OUTREACH (OUTPATIENT)
Dept: LABOR AND DELIVERY | Facility: HOSPITAL | Age: 23
End: 2023-09-28
Payer: COMMERCIAL

## 2023-09-28 NOTE — OUTREACH NOTE
[facility]  Unable to Reach       Questions/Answers      Flowsheet Row Responses   Pending Outreach Prenatal Check-in   Call Attempt Second   Outcome Left message   Next Call Attempt Date 10/06/23            Left message with call back number. MC information and EPDS sent by [employer].    Marissa Reynaga RN  Maternity Nurse Navigator    9/28/2023, 14:49 EDT

## 2023-10-06 ENCOUNTER — PATIENT OUTREACH (OUTPATIENT)
Dept: LABOR AND DELIVERY | Facility: HOSPITAL | Age: 23
End: 2023-10-06
Payer: COMMERCIAL

## 2023-10-06 NOTE — OUTREACH NOTE
[facility]  Unable to Reach       Questions/Answers      Flowsheet Row Responses   Pending Outreach Prenatal Check-in   Call Attempt Third   Outcome Left message   Next Call Attempt Date 10/27/23            Left message with call back number.    Marissa Reynaga RN  Maternity Nurse Navigator    10/6/2023, 16:03 EDT

## 2023-10-11 ENCOUNTER — HOSPITAL ENCOUNTER (OUTPATIENT)
Dept: RESPIRATORY THERAPY | Facility: HOSPITAL | Age: 23
Discharge: HOME | End: 2023-10-11
Admitting: OBSTETRICS & GYNECOLOGY
Payer: COMMERCIAL

## 2023-10-11 ENCOUNTER — TRANSCRIBE ORDERS (OUTPATIENT)
Dept: ADMINISTRATIVE | Facility: HOSPITAL | Age: 23
End: 2023-10-11
Payer: COMMERCIAL

## 2023-10-11 DIAGNOSIS — R00.0 RACING HEART BEAT: ICD-10-CM

## 2023-10-11 DIAGNOSIS — R00.0 RACING HEART BEAT: Primary | ICD-10-CM

## 2023-10-11 PROCEDURE — 93225 XTRNL ECG REC<48 HRS REC: CPT

## 2023-10-18 ENCOUNTER — HOSPITAL ENCOUNTER (OUTPATIENT)
Facility: HOSPITAL | Age: 23
Discharge: HOME | End: 2023-10-18
Attending: OBSTETRICS & GYNECOLOGY | Admitting: OBSTETRICS & GYNECOLOGY
Payer: COMMERCIAL

## 2023-10-18 ENCOUNTER — APPOINTMENT (OUTPATIENT)
Dept: ULTRASOUND IMAGING | Facility: HOSPITAL | Age: 23
End: 2023-10-18
Payer: COMMERCIAL

## 2023-10-18 VITALS
BODY MASS INDEX: 44.41 KG/M2 | WEIGHT: 293 LBS | OXYGEN SATURATION: 96 % | RESPIRATION RATE: 20 BRPM | TEMPERATURE: 97.9 F | HEART RATE: 98 BPM | SYSTOLIC BLOOD PRESSURE: 122 MMHG | HEIGHT: 68 IN | DIASTOLIC BLOOD PRESSURE: 58 MMHG

## 2023-10-18 LAB
ALBUMIN SERPL-MCNC: 3.5 G/DL (ref 3.5–5.2)
ALBUMIN/GLOB SERPL: 1.1 G/DL
ALP SERPL-CCNC: 96 U/L (ref 39–117)
ALT SERPL W P-5'-P-CCNC: 18 U/L (ref 1–33)
ANION GAP SERPL CALCULATED.3IONS-SCNC: 12 MMOL/L (ref 5–15)
AST SERPL-CCNC: 17 U/L (ref 1–32)
BASOPHILS # BLD AUTO: 0.04 10*3/MM3 (ref 0–0.2)
BASOPHILS NFR BLD AUTO: 0.4 % (ref 0–1.5)
BILIRUB SERPL-MCNC: 0.2 MG/DL (ref 0–1.2)
BUN SERPL-MCNC: 11 MG/DL (ref 6–20)
BUN/CREAT SERPL: 25 (ref 7–25)
CALCIUM SPEC-SCNC: 9.3 MG/DL (ref 8.6–10.5)
CHLORIDE SERPL-SCNC: 106 MMOL/L (ref 98–107)
CO2 SERPL-SCNC: 20 MMOL/L (ref 22–29)
CREAT SERPL-MCNC: 0.44 MG/DL (ref 0.57–1)
DEPRECATED RDW RBC AUTO: 46.8 FL (ref 37–54)
EGFRCR SERPLBLD CKD-EPI 2021: 138.7 ML/MIN/1.73
EOSINOPHIL # BLD AUTO: 0.1 10*3/MM3 (ref 0–0.4)
EOSINOPHIL NFR BLD AUTO: 1.1 % (ref 0.3–6.2)
ERYTHROCYTE [DISTWIDTH] IN BLOOD BY AUTOMATED COUNT: 14.6 % (ref 12.3–15.4)
GLOBULIN UR ELPH-MCNC: 3.1 GM/DL
GLUCOSE SERPL-MCNC: 114 MG/DL (ref 65–99)
HCT VFR BLD AUTO: 37.3 % (ref 34–46.6)
HGB BLD-MCNC: 12 G/DL (ref 12–15.9)
IMM GRANULOCYTES # BLD AUTO: 0.08 10*3/MM3 (ref 0–0.05)
IMM GRANULOCYTES NFR BLD AUTO: 0.9 % (ref 0–0.5)
LDH SERPL-CCNC: 131 U/L (ref 135–214)
LYMPHOCYTES # BLD AUTO: 1.38 10*3/MM3 (ref 0.7–3.1)
LYMPHOCYTES NFR BLD AUTO: 14.8 % (ref 19.6–45.3)
MCH RBC QN AUTO: 28.4 PG (ref 26.6–33)
MCHC RBC AUTO-ENTMCNC: 32.2 G/DL (ref 31.5–35.7)
MCV RBC AUTO: 88.2 FL (ref 79–97)
MONOCYTES # BLD AUTO: 0.57 10*3/MM3 (ref 0.1–0.9)
MONOCYTES NFR BLD AUTO: 6.1 % (ref 5–12)
NEUTROPHILS NFR BLD AUTO: 7.13 10*3/MM3 (ref 1.7–7)
NEUTROPHILS NFR BLD AUTO: 76.7 % (ref 42.7–76)
NRBC BLD AUTO-RTO: 0 /100 WBC (ref 0–0.2)
PLATELET # BLD AUTO: 227 10*3/MM3 (ref 140–450)
PMV BLD AUTO: 11.8 FL (ref 6–12)
POTASSIUM SERPL-SCNC: 3.7 MMOL/L (ref 3.5–5.2)
PROT SERPL-MCNC: 6.6 G/DL (ref 6–8.5)
RBC # BLD AUTO: 4.23 10*6/MM3 (ref 3.77–5.28)
SODIUM SERPL-SCNC: 138 MMOL/L (ref 136–145)
URATE SERPL-MCNC: 4.8 MG/DL (ref 2.4–5.7)
WBC NRBC COR # BLD: 9.3 10*3/MM3 (ref 3.4–10.8)

## 2023-10-18 PROCEDURE — 83615 LACTATE (LD) (LDH) ENZYME: CPT | Performed by: OBSTETRICS & GYNECOLOGY

## 2023-10-18 PROCEDURE — 84550 ASSAY OF BLOOD/URIC ACID: CPT | Performed by: OBSTETRICS & GYNECOLOGY

## 2023-10-18 PROCEDURE — G0463 HOSPITAL OUTPT CLINIC VISIT: HCPCS

## 2023-10-18 PROCEDURE — 76819 FETAL BIOPHYS PROFIL W/O NST: CPT

## 2023-10-18 PROCEDURE — 36415 COLL VENOUS BLD VENIPUNCTURE: CPT | Performed by: OBSTETRICS & GYNECOLOGY

## 2023-10-18 PROCEDURE — 80053 COMPREHEN METABOLIC PANEL: CPT | Performed by: OBSTETRICS & GYNECOLOGY

## 2023-10-18 PROCEDURE — 85025 COMPLETE CBC W/AUTO DIFF WBC: CPT | Performed by: OBSTETRICS & GYNECOLOGY

## 2023-10-18 PROCEDURE — 76819 FETAL BIOPHYS PROFIL W/O NST: CPT | Performed by: RADIOLOGY

## 2023-10-19 ENCOUNTER — HOSPITAL ENCOUNTER (INPATIENT)
Facility: HOSPITAL | Age: 23
LOS: 4 days | Discharge: HOME | End: 2023-10-23
Attending: OBSTETRICS & GYNECOLOGY | Admitting: OBSTETRICS & GYNECOLOGY
Payer: COMMERCIAL

## 2023-10-19 ENCOUNTER — HOSPITAL ENCOUNTER (OUTPATIENT)
Dept: LABOR AND DELIVERY | Facility: HOSPITAL | Age: 23
Discharge: HOME | End: 2023-10-19
Payer: COMMERCIAL

## 2023-10-19 DIAGNOSIS — O14.90 PRE-ECLAMPSIA AFFECTING PREGNANCY, ANTEPARTUM: ICD-10-CM

## 2023-10-19 PROBLEM — Z34.90 PREGNANCY: Status: ACTIVE | Noted: 2023-10-19

## 2023-10-19 LAB
ABO GROUP BLD: NORMAL
ABO GROUP BLD: NORMAL
AMPHET+METHAMPHET UR QL: NEGATIVE
AMPHETAMINES UR QL: NEGATIVE
BARBITURATES UR QL SCN: NEGATIVE
BASOPHILS # BLD AUTO: 0.03 10*3/MM3 (ref 0–0.2)
BASOPHILS NFR BLD AUTO: 0.3 % (ref 0–1.5)
BENZODIAZ UR QL SCN: NEGATIVE
BLD GP AB SCN SERPL QL: NEGATIVE
BUPRENORPHINE SERPL-MCNC: NEGATIVE NG/ML
CANNABINOIDS SERPL QL: NEGATIVE
COCAINE UR QL: NEGATIVE
DEPRECATED RDW RBC AUTO: 46.5 FL (ref 37–54)
EOSINOPHIL # BLD AUTO: 0.12 10*3/MM3 (ref 0–0.4)
EOSINOPHIL NFR BLD AUTO: 1.3 % (ref 0.3–6.2)
ERYTHROCYTE [DISTWIDTH] IN BLOOD BY AUTOMATED COUNT: 14.6 % (ref 12.3–15.4)
EXTERNAL GROUP B STREP ANTIGEN: NORMAL
FENTANYL UR-MCNC: NEGATIVE NG/ML
HCT VFR BLD AUTO: 37.3 % (ref 34–46.6)
HGB BLD-MCNC: 12.3 G/DL (ref 12–15.9)
IMM GRANULOCYTES # BLD AUTO: 0.05 10*3/MM3 (ref 0–0.05)
IMM GRANULOCYTES NFR BLD AUTO: 0.5 % (ref 0–0.5)
LYMPHOCYTES # BLD AUTO: 1.79 10*3/MM3 (ref 0.7–3.1)
LYMPHOCYTES NFR BLD AUTO: 19.4 % (ref 19.6–45.3)
MCH RBC QN AUTO: 28.9 PG (ref 26.6–33)
MCHC RBC AUTO-ENTMCNC: 33 G/DL (ref 31.5–35.7)
MCV RBC AUTO: 87.8 FL (ref 79–97)
METHADONE UR QL SCN: NEGATIVE
MONOCYTES # BLD AUTO: 0.41 10*3/MM3 (ref 0.1–0.9)
MONOCYTES NFR BLD AUTO: 4.4 % (ref 5–12)
NEUTROPHILS NFR BLD AUTO: 6.85 10*3/MM3 (ref 1.7–7)
NEUTROPHILS NFR BLD AUTO: 74.1 % (ref 42.7–76)
NRBC BLD AUTO-RTO: 0 /100 WBC (ref 0–0.2)
OPIATES UR QL: NEGATIVE
OXYCODONE UR QL SCN: NEGATIVE
PCP UR QL SCN: NEGATIVE
PLATELET # BLD AUTO: 249 10*3/MM3 (ref 140–450)
PMV BLD AUTO: 11.6 FL (ref 6–12)
RBC # BLD AUTO: 4.25 10*6/MM3 (ref 3.77–5.28)
RH BLD: POSITIVE
RH BLD: POSITIVE
T&S EXPIRATION DATE: NORMAL
TRICYCLICS UR QL SCN: NEGATIVE
WBC NRBC COR # BLD: 9.25 10*3/MM3 (ref 3.4–10.8)

## 2023-10-19 PROCEDURE — 86900 BLOOD TYPING SEROLOGIC ABO: CPT | Performed by: OBSTETRICS & GYNECOLOGY

## 2023-10-19 PROCEDURE — 86900 BLOOD TYPING SEROLOGIC ABO: CPT

## 2023-10-19 PROCEDURE — 25810000003 LACTATED RINGERS PER 1000 ML: Performed by: OBSTETRICS & GYNECOLOGY

## 2023-10-19 PROCEDURE — 25810000003 SODIUM CHLORIDE 0.9 % SOLUTION: Performed by: OBSTETRICS & GYNECOLOGY

## 2023-10-19 PROCEDURE — 25010000002 VANCOMYCIN 5 G RECONSTITUTED SOLUTION: Performed by: OBSTETRICS & GYNECOLOGY

## 2023-10-19 PROCEDURE — 86901 BLOOD TYPING SEROLOGIC RH(D): CPT

## 2023-10-19 PROCEDURE — 86901 BLOOD TYPING SEROLOGIC RH(D): CPT | Performed by: OBSTETRICS & GYNECOLOGY

## 2023-10-19 PROCEDURE — 80307 DRUG TEST PRSMV CHEM ANLYZR: CPT | Performed by: OBSTETRICS & GYNECOLOGY

## 2023-10-19 PROCEDURE — 85025 COMPLETE CBC W/AUTO DIFF WBC: CPT | Performed by: OBSTETRICS & GYNECOLOGY

## 2023-10-19 PROCEDURE — 86850 RBC ANTIBODY SCREEN: CPT | Performed by: OBSTETRICS & GYNECOLOGY

## 2023-10-19 RX ORDER — PRENATAL VIT NO.126/IRON/FOLIC 28MG-0.8MG
1 TABLET ORAL DAILY
COMMUNITY

## 2023-10-19 RX ORDER — MAGNESIUM HYDROXIDE 1200 MG/15ML
1000 LIQUID ORAL ONCE AS NEEDED
Status: DISCONTINUED | OUTPATIENT
Start: 2023-10-19 | End: 2023-10-21

## 2023-10-19 RX ORDER — SODIUM CHLORIDE, SODIUM LACTATE, POTASSIUM CHLORIDE, CALCIUM CHLORIDE 600; 310; 30; 20 MG/100ML; MG/100ML; MG/100ML; MG/100ML
125 INJECTION, SOLUTION INTRAVENOUS CONTINUOUS
Status: DISCONTINUED | OUTPATIENT
Start: 2023-10-19 | End: 2023-10-23 | Stop reason: HOSPADM

## 2023-10-19 RX ORDER — MISOPROSTOL 100 UG/1
25 TABLET ORAL EVERY 4 HOURS PRN
Status: DISCONTINUED | OUTPATIENT
Start: 2023-10-19 | End: 2023-10-20

## 2023-10-19 RX ORDER — ACETAMINOPHEN 325 MG/1
650 TABLET ORAL EVERY 4 HOURS PRN
Status: DISCONTINUED | OUTPATIENT
Start: 2023-10-19 | End: 2023-10-21

## 2023-10-19 RX ORDER — FEXOFENADINE HCL 180 MG/1
180 TABLET ORAL DAILY
Status: ON HOLD | COMMUNITY
End: 2023-10-19

## 2023-10-19 RX ORDER — FAMOTIDINE 10 MG/ML
20 INJECTION, SOLUTION INTRAVENOUS EVERY 12 HOURS PRN
Status: DISCONTINUED | OUTPATIENT
Start: 2023-10-19 | End: 2023-10-21

## 2023-10-19 RX ORDER — TERBUTALINE SULFATE 1 MG/ML
0.2 INJECTION, SOLUTION SUBCUTANEOUS AS NEEDED
Status: DISCONTINUED | OUTPATIENT
Start: 2023-10-19 | End: 2023-10-21

## 2023-10-19 RX ORDER — CLINDAMYCIN HYDROCHLORIDE 300 MG/1
300 CAPSULE ORAL 4 TIMES DAILY
Status: ON HOLD | COMMUNITY
End: 2023-10-19

## 2023-10-19 RX ORDER — VANCOMYCIN 2 GRAM/500 ML IN 0.9 % SODIUM CHLORIDE INTRAVENOUS
2000 EVERY 8 HOURS
Status: DISPENSED | OUTPATIENT
Start: 2023-10-19 | End: 2023-10-21

## 2023-10-19 RX ORDER — SODIUM CHLORIDE 9 MG/ML
40 INJECTION, SOLUTION INTRAVENOUS AS NEEDED
Status: DISCONTINUED | OUTPATIENT
Start: 2023-10-19 | End: 2023-10-21

## 2023-10-19 RX ORDER — OXYTOCIN/0.9 % SODIUM CHLORIDE 30/500 ML
2-20 PLASTIC BAG, INJECTION (ML) INTRAVENOUS
Status: DISCONTINUED | OUTPATIENT
Start: 2023-10-20 | End: 2023-10-19

## 2023-10-19 RX ORDER — FAMOTIDINE 20 MG/1
20 TABLET, FILM COATED ORAL EVERY 12 HOURS PRN
Status: DISCONTINUED | OUTPATIENT
Start: 2023-10-19 | End: 2023-10-21

## 2023-10-19 RX ORDER — SODIUM CHLORIDE 0.9 % (FLUSH) 0.9 %
10 SYRINGE (ML) INJECTION EVERY 12 HOURS SCHEDULED
Status: DISCONTINUED | OUTPATIENT
Start: 2023-10-19 | End: 2023-10-21

## 2023-10-19 RX ORDER — SODIUM CHLORIDE 0.9 % (FLUSH) 0.9 %
10 SYRINGE (ML) INJECTION AS NEEDED
Status: DISCONTINUED | OUTPATIENT
Start: 2023-10-19 | End: 2023-10-21

## 2023-10-19 RX ORDER — ONDANSETRON 2 MG/ML
4 INJECTION INTRAMUSCULAR; INTRAVENOUS EVERY 6 HOURS PRN
Status: DISCONTINUED | OUTPATIENT
Start: 2023-10-19 | End: 2023-10-21

## 2023-10-19 RX ORDER — ONDANSETRON 4 MG/1
4 TABLET, FILM COATED ORAL EVERY 6 HOURS PRN
Status: DISCONTINUED | OUTPATIENT
Start: 2023-10-19 | End: 2023-10-21

## 2023-10-19 RX ORDER — BUTORPHANOL TARTRATE 1 MG/ML
1 INJECTION, SOLUTION INTRAMUSCULAR; INTRAVENOUS
Status: DISCONTINUED | OUTPATIENT
Start: 2023-10-19 | End: 2023-10-20

## 2023-10-19 RX ORDER — HYDROXYZINE HYDROCHLORIDE 25 MG/1
50 TABLET, FILM COATED ORAL NIGHTLY PRN
Status: DISCONTINUED | OUTPATIENT
Start: 2023-10-19 | End: 2023-10-21

## 2023-10-19 RX ORDER — OXYTOCIN/0.9 % SODIUM CHLORIDE 30/500 ML
2-20 PLASTIC BAG, INJECTION (ML) INTRAVENOUS
Status: DISCONTINUED | OUTPATIENT
Start: 2023-10-20 | End: 2023-10-21

## 2023-10-19 RX ORDER — FAMOTIDINE 20 MG/1
20 TABLET, FILM COATED ORAL 2 TIMES DAILY
Status: ON HOLD | COMMUNITY
End: 2023-10-19

## 2023-10-19 RX ORDER — LIDOCAINE HYDROCHLORIDE 10 MG/ML
0.5 INJECTION, SOLUTION INFILTRATION; PERINEURAL ONCE AS NEEDED
Status: DISCONTINUED | OUTPATIENT
Start: 2023-10-19 | End: 2023-10-21

## 2023-10-19 RX ORDER — PRENATAL VIT/IRON FUM/FOLIC AC 27MG-0.8MG
1 TABLET ORAL DAILY
Status: DISCONTINUED | OUTPATIENT
Start: 2023-10-21 | End: 2023-10-23 | Stop reason: HOSPADM

## 2023-10-19 RX ADMIN — VANCOMYCIN HYDROCHLORIDE 2000 MG: 5 INJECTION, POWDER, LYOPHILIZED, FOR SOLUTION INTRAVENOUS at 23:02

## 2023-10-19 RX ADMIN — SODIUM CHLORIDE, POTASSIUM CHLORIDE, SODIUM LACTATE AND CALCIUM CHLORIDE 125 ML/HR: 600; 310; 30; 20 INJECTION, SOLUTION INTRAVENOUS at 22:34

## 2023-10-20 PROCEDURE — 59025 FETAL NON-STRESS TEST: CPT

## 2023-10-20 PROCEDURE — 25010000002 VANCOMYCIN 5 G RECONSTITUTED SOLUTION: Performed by: OBSTETRICS & GYNECOLOGY

## 2023-10-20 PROCEDURE — 25010000002 DIPHENHYDRAMINE PER 50 MG: Performed by: OBSTETRICS & GYNECOLOGY

## 2023-10-20 PROCEDURE — 25010000002 MORPHINE PER 10 MG: Performed by: OBSTETRICS & GYNECOLOGY

## 2023-10-20 PROCEDURE — 25810000003 SODIUM CHLORIDE 0.9 % SOLUTION: Performed by: OBSTETRICS & GYNECOLOGY

## 2023-10-20 PROCEDURE — 25810000003 LACTATED RINGERS PER 1000 ML: Performed by: OBSTETRICS & GYNECOLOGY

## 2023-10-20 RX ORDER — MORPHINE SULFATE 2 MG/ML
2 INJECTION, SOLUTION INTRAMUSCULAR; INTRAVENOUS
Status: DISCONTINUED | OUTPATIENT
Start: 2023-10-20 | End: 2023-10-23 | Stop reason: HOSPADM

## 2023-10-20 RX ORDER — MISOPROSTOL 100 UG/1
50 TABLET ORAL EVERY 6 HOURS PRN
Status: DISCONTINUED | OUTPATIENT
Start: 2023-10-20 | End: 2023-10-23 | Stop reason: HOSPADM

## 2023-10-20 RX ORDER — DIPHENHYDRAMINE HYDROCHLORIDE 50 MG/ML
25 INJECTION INTRAMUSCULAR; INTRAVENOUS EVERY 6 HOURS PRN
Status: DISCONTINUED | OUTPATIENT
Start: 2023-10-20 | End: 2023-10-23 | Stop reason: HOSPADM

## 2023-10-20 RX ADMIN — MORPHINE SULFATE 2 MG: 2 INJECTION, SOLUTION INTRAMUSCULAR; INTRAVENOUS at 14:17

## 2023-10-20 RX ADMIN — MISOPROSTOL 50 MCG: 100 TABLET ORAL at 13:37

## 2023-10-20 RX ADMIN — VANCOMYCIN HYDROCHLORIDE 2000 MG: 5 INJECTION, POWDER, LYOPHILIZED, FOR SOLUTION INTRAVENOUS at 15:07

## 2023-10-20 RX ADMIN — SODIUM CHLORIDE, POTASSIUM CHLORIDE, SODIUM LACTATE AND CALCIUM CHLORIDE 125 ML/HR: 600; 310; 30; 20 INJECTION, SOLUTION INTRAVENOUS at 13:54

## 2023-10-20 RX ADMIN — MISOPROSTOL 25 MCG: 100 TABLET ORAL at 07:45

## 2023-10-20 RX ADMIN — ACETAMINOPHEN 650 MG: 325 TABLET ORAL at 02:50

## 2023-10-20 RX ADMIN — HYDROXYZINE HYDROCHLORIDE 50 MG: 25 TABLET, FILM COATED ORAL at 00:10

## 2023-10-20 RX ADMIN — MISOPROSTOL 25 MCG: 100 TABLET ORAL at 00:08

## 2023-10-20 RX ADMIN — VANCOMYCIN HYDROCHLORIDE 2000 MG: 5 INJECTION, POWDER, LYOPHILIZED, FOR SOLUTION INTRAVENOUS at 08:31

## 2023-10-20 RX ADMIN — DIPHENHYDRAMINE HYDROCHLORIDE 25 MG: 50 INJECTION, SOLUTION INTRAMUSCULAR; INTRAVENOUS at 02:19

## 2023-10-20 RX ADMIN — MORPHINE SULFATE 2 MG: 2 INJECTION, SOLUTION INTRAMUSCULAR; INTRAVENOUS at 11:58

## 2023-10-20 RX ADMIN — SODIUM CHLORIDE, POTASSIUM CHLORIDE, SODIUM LACTATE AND CALCIUM CHLORIDE 125 ML/HR: 600; 310; 30; 20 INJECTION, SOLUTION INTRAVENOUS at 02:14

## 2023-10-20 RX ADMIN — MORPHINE SULFATE 2 MG: 2 INJECTION, SOLUTION INTRAMUSCULAR; INTRAVENOUS at 07:28

## 2023-10-20 NOTE — PAYOR COMM NOTE
"CONTACT:  DONALD METZGER, RN  UTILIZATION MANAGEMENT DEPT.   [facility]   [address] [facility], [address]   PHONE:  [phone]   FAX: [phone]       INPATIENT AUTH REQUEST      Anjali Erickson (22 y.o. Female)       Date of Birth   2000    Social Security Number       Address   [address] [facility] [address]    Home Phone   [phone]    MRN   [id number]       [employer]   [employer]    Marital Status   Single                            Admission Date   10/19/23    Admission Type   Elective    Admitting Provider   Mony Avina DO    Attending Provider   Mejia Harrell MD    Department, Room/Bed   [facility] LABOR DELIVERY, L228       Discharge Date       Discharge Disposition       Discharge Destination                                 Attending Provider: Mejia Harrell MD    Allergies: Augmentin [Amoxicillin-pot Clavulanate]    Isolation: None   Infection: None   Code Status: CPR    Ht: 172.7 cm (68\")   Wt: 163 kg (360 lb)    Admission Cmt: None   Principal Problem: Pregnancy [Z34.90]                   Active Insurance as of 10/19/2023       Primary Coverage       Payor Plan Insurance Group Employer/Plan Group    [employer] [place] [employer]ICA[place]        Payor Plan Address Payor Plan Phone Number Payor Plan Fax Number Effective Dates    [address] [address] [phone]  2017 - None Entered    [facility] [id number]         Subscriber Name Subscriber Birth Date Member ID       ANJALI ERICKSON 2000 [id number]                     Emergency Contacts        (Rel.) Home Phone Work Phone Mobile Phone    KeyonnaNatasha miranda (Other) [phone] -- --                 History & Physical        Mejia Harrell MD at 10/20/23 1430          Obstetric History and Physical    Chief Complaint   Patient presents with    Scheduled Induction     CYTOTEC INDUCTION       Subjective    Patient is a 22 y.o. female  currently at 37 weeks gestation who is " admitted for induction of labor secondary to preeclampsia. She feels fetal movement and denies headache, scotomata or shortness of breath.     Her prenatal care is complicated by  hypertension  pre-eclampsia.  Her previous obstetric/gynecological history is noted for is non-contributory.    The following portions of the patients history were reviewed and updated as appropriate: current medications, allergies, past medical history, past surgical history, past family history, past social history, and problem list .       Prenatal Information:   Maternal Prenatal Labs  Blood Type ABO Type   Date Value Ref Range Status   10/19/2023 B  Final      Rh Status RH type   Date Value Ref Range Status   10/19/2023 Positive  Final      Antibody Screen Antibody Screen   Date Value Ref Range Status   10/19/2023 Negative  Final      Rapid Urin Drug Screen Barbiturates Screen, Urine   Date Value Ref Range Status   10/19/2023 Negative Negative Final     Benzodiazepine Screen, Urine   Date Value Ref Range Status   10/19/2023 Negative Negative Final     Methadone Screen, Urine   Date Value Ref Range Status   10/19/2023 Negative Negative Final     Opiate Screen   Date Value Ref Range Status   10/19/2023 Negative Negative Final     THC, Screen, Urine   Date Value Ref Range Status   10/19/2023 Negative Negative Final     Cocaine Screen, Urine   Date Value Ref Range Status   10/19/2023 Negative Negative Final     Amphetamine Screen, Urine   Date Value Ref Range Status   10/19/2023 Negative Negative Final     Buprenorphine, Screen, Urine   Date Value Ref Range Status   10/19/2023 Negative Negative Final     Methamphetamine, Ur   Date Value Ref Range Status   10/19/2023 Negative Negative Final     Oxycodone Screen, Urine   Date Value Ref Range Status   10/19/2023 Negative Negative Final     Tricyclic Antidepressants Screen   Date Value Ref Range Status   10/19/2023 Negative Negative Final      Group B Strep Culture No results found for:  "\"GBSANTIGEN\"           External Prenatal Results       Pregnancy Outside Results - Transcribed From Office Records - See Scanned Records For Details       Test Value Date Time    ABO  A  10/14/21 1822    Rh  Positive  10/14/21 182    Antibody Screen  Negative  10/14/21 182       Negative  10/11/21 0715    Varicella IgG       Rubella ^ Immune  21     Hgb  9.8 g/dL 10/14/21 1822       9.7 g/dL 10/11/21 0715       11.4 g/dL 21 0505       12.5 g/dL 21 0909    Hct  30.7 % 10/14/21 1822       30.2 % 10/11/21 0715       34.4 % 21 0505       37.6 % 21 0909    Glucose Fasting GTT       Glucose Tolerance Test 1 hour ^ 114  21     Glucose Tolerance Test 3 hour       Gonorrhea (discrete) ^ NEG  21     Chlamydia (discrete) ^ NEG  21     RPR ^ Non-Reactive  21     VDRL       Syphilis Antibody       HBsAg ^ Negative  21     Herpes Simplex Virus PCR       Herpes Simplex VIrus Culture       HIV ^ Negative  21     Hep C RNA Quant PCR       Hep C Antibody       AFP       Group B Strep ^ NEG  21     GBS Susceptibility to Clindamycin       GBS Susceptibility to Erythromycin       Fetal Fibronectin       Genetic Testing, Maternal Blood                 Drug Screening       Test Value Date Time    Urine Drug Screen       Amphetamine Screen  Negative  18 1157    Barbiturate Screen  Negative  18 1157    Benzodiazepine Screen  Negative  18 1157    Methadone Screen  Negative  18 1157    Phencyclidine Screen  Negative  18 1157    Opiates Screen  Negative  18 1157    THC Screen  Negative  18 1157    Cocaine Screen       Propoxyphene Screen       Buprenorphine Screen  Negative  18 1157    Methamphetamine Screen       Oxycodone Screen  Negative  18 1157    Tricyclic Antidepressants Screen                 Legend    ^: Historical                              Past OB History:     OB History    Para Term  AB " Living   1 0 0 0 0 0   SAB IAB Ectopic Molar Multiple Live Births   0 0 0 0 0 0      # Outcome Date GA Lbr Armando/2nd Weight Sex Delivery Anes PTL Lv   1 Current                Past Medical History: Past Medical History:   Diagnosis Date    Kidney stones     Ovarian cyst       Past Surgical History Past Surgical History:   Procedure Laterality Date    CYST REMOVAL      TAILBONE    LAPAROSCOPIC CHOLECYSTECTOMY      TONSILLECTOMY      TYMPANOSTOMY TUBE PLACEMENT      WISDOM TOOTH EXTRACTION        Family History: Family History   Problem Relation Age of Onset    Hypertension Mother     Hyperlipidemia Mother     Nephrolithiasis Father     Thyroid disease Sister     Diabetes Paternal Aunt     Diabetes Paternal Grandmother       Social History:  reports that she has never smoked. She has never used smokeless tobacco.   reports no history of alcohol use.   reports no history of drug use.        Review of Systems        Objective    Vital Signs Range for the last 24 hours  Temperature: Temp:  [96.3 °F (35.7 °C)-98.6 °F (37 °C)] 98.6 °F (37 °C)   Temp Source: Temp src: Axillary   BP: BP: (115-137)/(55-75) 137/73   Pulse: Heart Rate:  [] 90   Respirations: Resp:  [18] 18   Weight: Weight:  [163 kg (360 lb)] 163 kg (360 lb)     Physical Examination:     Physical exam:    Constitutional:  Well-developed and well-nourished.  No respiratory distress.      HENT:  Head:  Normocephalic and atraumatic.  Mouth:  Moist mucous membranes.    Eyes:  Conjunctivae and EOM are normal.  No scleral icterus.    Neck:  Neck supple.  No JVD present.    Cardiovascular:  Normal rate, regular rhythm and normal heart sounds with no murmur. No edema.  Pulmonary/Chest:  No respiratory distress, no wheezes, no crackles, with normal breath sounds and good air movement.  Abdominal:  Soft.  FH 38 cm, FHR 130s category 1 tracing  Musculoskeletal:  No edema, no tenderness, and no deformity.  No red or swollen joints anywhere.    Neurological:  Alert and  "oriented to person, place, and time. No facial droop.  No slurred speech.   Skin:  Skin is warm and dry. No rash noted. No pallor.   Pelvic Exam: normal external female genitalia, v    Presentation: Cephalic Late entry, exam was done at 9:00 AM.    Cervix: Exam by:   Dr Harrell   Dilation:  2/60/-2   Effacement:     Station:  -2     Laboratory Results:   CBC and coagulation:  Results from last 7 days   Lab Units 10/19/23  2152 10/18/23  1049   WBC 10*3/mm3 9.25 9.30   HEMOGLOBIN g/dL 12.3 12.0   HEMATOCRIT % 37.3 37.3   MCV fL 87.8 88.2   MCHC g/dL 33.0 32.2   PLATELETS 10*3/mm3 249 227       Renal and electrolytes:  Results from last 7 days   Lab Units 10/18/23  1049   SODIUM mmol/L 138   POTASSIUM mmol/L 3.7   CHLORIDE mmol/L 106   CO2 mmol/L 20.0*   BUN mg/dL 11   CREATININE mg/dL 0.44*   CALCIUM mg/dL 9.3   GLUCOSE mg/dL 114*     Estimated Creatinine Clearance: 323.7 mL/min (A) (by C-G formula based on SCr of 0.44 mg/dL (L)).    Liver and pancreatic function:  Results from last 7 days   Lab Units 10/18/23  1049   ALBUMIN g/dL 3.5   BILIRUBIN mg/dL 0.2   ALK PHOS U/L 96   AST (SGOT) U/L 17   ALT (SGPT) U/L 18     Endocrine function:  No results found for: \"HGBA1C\"  Point of care bedside glucose levels:      No results found for: \"TSH\", \"FREET4\"  Cardiac:        Cultures:  Lab Results   Component Value Date    COLORU Yellow 11/11/2019    CLARITYU Cloudy (A) 11/11/2019    PHUR <=5.0 11/11/2019    GLUCOSEU Negative 11/11/2019    KETONESU Trace (A) 11/11/2019    BLOODU Moderate (2+) (A) 11/11/2019    NITRITEU Negative 11/11/2019    LEUKOCYTESUR Small (1+) (A) 11/11/2019    BILIRUBINUR Negative 11/11/2019    UROBILINOGEN 1.0 E.U./dL 11/11/2019    RBCUA 3-5 (A) 11/11/2019    WBCUA 31-50 (A) 11/11/2019    BACTERIA 2+ (A) 11/11/2019     Microbiology Results (last 10 days)       Procedure Component Value - Date/Time    Group B Streptococcus Culture - Swab, Vaginal/Rectum [[id number]] Resulted: 10/19/23    Lab Status: Final " result Specimen: Swab from Vaginal/Rectum Updated: 10/19/23 5305     External Strep Group B Ag Unknown            Lab Results   Component Value Date    PREGTESTUR Negative 11/11/2019     Pain Management Panel  More data may exist         Latest Ref Rng & Units 10/19/2023 6/17/2017   Pain Management Panel   Amphetamine, Urine Qual Negative Negative  Negative    Barbiturates Screen, Urine Negative Negative  Negative    Benzodiazepine Screen, Urine Negative Negative  Negative    Buprenorphine, Screen, Urine Negative Negative  Negative    Cocaine Screen, Urine Negative Negative  Negative    Fentanyl, Urine Negative Negative  -   Methadone Screen , Urine Negative Negative  Negative    Methamphetamine, Ur Negative Negative  -       Radiology Review:  US Fetal Biophysical Profile;Without Non-Stress Testing    Result Date: 10/18/2023    BPP 8 out of 8.   This report was finalized on 10/18/2023 1:57 PM by Dr. Osman Yanes MD.         Assessment & Plan      Pregnancy        Assessment:  1.  Intrauterine pregnancy at 40w0d weeks gestation with reassuring fetal status.    2.  Preeclampsia  3.  GBS status: negative.   Plan:  1. Continue with induction with pitocin.  AROM performed clear.   2. Plan of care has been reviewed with patient and she agreed with the plan of care. 3.  Risks, benefits of treatment plan have been discussed.  4.  All questions have been answered.    Electronically signed by Mejia Harrell MD at 10/20/23 1436       H&P signed by New Onbase, [place] at 10/20/23 0810         [Media Unavailable] Scan on 10/19/2023 by New Onbase, [place]: H&P, [facility], 10/19/2023          Electronically signed by New Onbase, [place] at 10/20/23 0810       Facility-Administered Medications as of 10/20/2023   Medication Dose Route Frequency Provider Last Rate Last Admin    acetaminophen (TYLENOL) tablet 650 mg  650 mg Oral Q4H PRN Jd Harris III, MD   650 mg at 10/20/23 0250    diphenhydrAMINE (BENADRYL)  injection 25 mg  25 mg Intravenous Q6H PRN Jd Harris III, MD   25 mg at 10/20/23 0219    famotidine (PEPCID) injection 20 mg  20 mg Intravenous Q12H PRN Jd Harris III, MD        Or    famotidine (PEPCID) tablet 20 mg  20 mg Oral Q12H PRN Jd Harris III, MD        hydrOXYzine (ATARAX) tablet 50 mg  50 mg Oral Nightly PRN Jd Harris III, MD   50 mg at 10/20/23 0010    lactated ringers bolus 1,000 mL  1,000 mL Intravenous Once PRN Jd Harris III, MD        lactated ringers infusion  125 mL/hr Intravenous Continuous Jd Harris III,  mL/hr at 10/20/23 1354 125 mL/hr at 10/20/23 1354    lidocaine (XYLOCAINE) 1 % injection 0.5 mL  0.5 mL Intradermal Once PRN Jd Harris III, MD        miSOPROStol (CYTOTEC) tablet 50 mcg  50 mcg Vaginal Q6H PRN Mejia Harrell MD   50 mcg at 10/20/23 1337    morphine injection 2 mg  2 mg Intravenous Q2H PRN Mejia Harrell MD   2 mg at 10/20/23 1417    ondansetron (ZOFRAN) tablet 4 mg  4 mg Oral Q6H PRN Jd Harris III, MD        Or    ondansetron (ZOFRAN) injection 4 mg  4 mg Intravenous Q6H PRN Jd Harris III, MD        oxytocin (PITOCIN) 30 units in 0.9% sodium chloride 500 mL (premix)  2-20 ruma-units/min Intravenous Titrated Mejia Harrell MD        Pharmacy to dose vancomycin   Does not apply Q12H PRN Jd Harris III, MD        [START ON 10/21/2023] prenatal vitamin tablet 1 tablet  1 tablet Oral Daily Mejia Harrell MD        sodium chloride (NS) irrigation solution 1,000 mL  1,000 mL Irrigation Once PRN Jd Harris III, MD        sodium chloride 0.9 % flush 10 mL  10 mL Intravenous Q12H Jd Harris III, MD        sodium chloride 0.9 % flush 10 mL  10 mL Intravenous PRN Jd Harris III, MD        sodium chloride 0.9 % infusion 40 mL  40 mL Intravenous PRN Jd Harris III, MD        terbutaline (BRETHINE) injection 0.2 mg  0.2 mg Subcutaneous PRN Jd Harris III, MD        vancomycin IVPB 2000 mg in 0.9% Sodium Chloride 500 mL  2,000  mg Intravenous Q8H Jd Harris III,  mL/hr at 10/20/23 0831 2,000 mg at 10/20/23 0831     Orders (all)        Start     Ordered    10/21/23 0900  prenatal vitamin tablet 1 tablet  Daily         10/19/23 2332    10/20/23 1245  miSOPROStol (CYTOTEC) tablet 50 mcg  Every 6 Hours PRN         10/20/23 1233    10/20/23 0730  oxytocin (PITOCIN) 30 units in 0.9% sodium chloride 500 mL (premix)  Titrated         10/19/23 2309    10/20/23 0700  oxytocin (PITOCIN) 30 units in 0.9% sodium chloride 500 mL (premix)  Titrated,   Status:  Discontinued         10/19/23 2139    10/20/23 0356  morphine injection 2 mg  Every 2 Hours PRN         10/20/23 0357    10/20/23 0202  diphenhydrAMINE (BENADRYL) injection 25 mg  Every 6 Hours PRN         10/20/23 0203    10/20/23 0000  Vital Signs q 4 while awake  Every 4 Hours      Comments: While the patient is awake.    10/19/23 2139    10/19/23 2300  vancomycin IVPB 2000 mg in 0.9% Sodium Chloride 500 mL  Every 8 Hours         10/19/23 2147    10/19/23 2230  sodium chloride 0.9 % flush 10 mL  Every 12 Hours Scheduled         10/19/23 2139    10/19/23 2230  lactated ringers infusion  Continuous         10/19/23 2139    10/19/23 2216  ABO RH Specimen Verification  STAT         10/19/23 2215    10/19/23 2206  Fentanyl, Urine - Urine, Clean Catch  Once         10/19/23 2205    10/19/23 2200  famotidine (PEPCID) injection 20 mg  Every 12 Hours PRN        See Hyperspace for full Linked Orders Report.    10/19/23 2139    10/19/23 2200  famotidine (PEPCID) tablet 20 mg  Every 12 Hours PRN        See Hyperspace for full Linked Orders Report.    10/19/23 2139    10/19/23 2200  hydrOXYzine (ATARAX) tablet 50 mg  Nightly PRN         10/19/23 2139    10/19/23 2137  miSOPROStol (CYTOTEC) tablet 25 mcg  Every 4 Hours PRN,   Status:  Discontinued         10/19/23 2139    10/19/23 2135  Pharmacy to dose vancomycin  Every 12 Hours PRN         10/19/23 2139    10/19/23 2130  Admit To Obstetrics  Inpatient  Once         10/19/23 2139    10/19/23 2130  Code Status and Medical Interventions:  Continuous         10/19/23 2139    10/19/23 2130  Obtain Informed Consent  Once         10/19/23 2139    10/19/23 2130  Place Sequential Compression Device  Once         10/19/23 2139    10/19/23 2130  Maintain Sequential Compression Device  Continuous         10/19/23 2139    10/19/23 2130  Vital Signs Per Hospital Policy  Per Hospital Policy         10/19/23 2139    10/19/23 2130  Confirm Presence of Amniotic Fluid if Patient Presents With SROM  Once         10/19/23 2139    10/19/23 2130  Mini-Prep Prior to Delivery  Once         10/19/23 2139    10/19/23 2130  Continuous Fetal Monitoring With NST on Admission and Prior to Initiation of Oxytocin.  Per Order Details        Comments: Continuous Fetal Monitoring With NST on Admission & Prior to Initiation of Oxytocin.    10/19/23 2139    10/19/23 2130  External Uterine Contraction Monitoring  Per Hospital Policy         10/19/23 2139    10/19/23 2130  Notify Provider (Specified)  Until Discontinued         10/19/23 2139    10/19/23 2130  Notify Provider of Tachysystole (Per Hospital Algorithm)  Until Discontinued         10/19/23 2139    10/19/23 2130  Notify Provider if Membranes Ruptured, Bleeding Greater Than 1 Pad Per Hour, Fetal Heart Tone Abnormality or Severe Pain  Until Discontinued         10/19/23 2139    10/19/23 2130  May Ambulate if Membranes Intact or Head Engaged With Ruptured BOW or Normal Tracing for 20 Minutes  Until Discontinued         10/19/23 2139    10/19/23 2130  Initiate Group Beta Strep (GBS) Prophylaxis Protocol, If Criteria Met  Continuous        Comments: NO TREATMENT RECOMMENDED IF: 1) Maternal GBS Status Known Negative 2) Scheduled  Birth With Intact Membranes, Not in Labor 3) Maternal GBS Status Unknown, No Risk Factors  TREAT WITH ANTIBIOTICS IF:  1) Maternal GBS Status Known Positive 2) Maternal GBS Status Unknown With Risk  Factors: a)  Previous Infant Affected By GBS Infection b) GBS Urinary Tract Infection (UTI) or Bacteriuria During Pregnancy c) Unexplained Maternal Fever (100.4F (38C) or Greater) During Labor d)  Prolonged Rupture of Membranes (18 or More Hours) e) Gestational Age Less Than 37 Weeks    10/19/23 2139    10/19/23 2130  Assess Need for Indwelling Urinary Catheter - Follow Removal Protocol  Continuous        Comments: Indwelling Urinary Catheter Removal Criteria  Discontinue Indwelling Urinary Catheter Unless One of the Following is Present:  Urinary Retention or Obstruction  Chronic Urinary Catheter Use  End of Life  Critical Illness with Strict I/O   Tract or Abdominal Surgery  Stage 3/4 Sacral / Perineal Wound  Required Activity Restriction: Trauma  Required Activity Restriction: Spine Surgery  If Patient is Being Followed by Urology Contact Them PRIOR to Removal  Do Not Remove Indwelling Urinary Catheter Order is Present with a CLINICAL REASON to Maintain the Catheter. Provider is Required to Include a Clinical Reason to Maintain a Urinary Catheter    Patient Admitted With Indwelling Urinary Catheter (Not Placed at [facility])  Assess for Continued Need & Document Medical Necessity  If Infection is Suspected, Contact the Provider       See Valeriece for full Linked Orders Report.    10/19/23 2139    10/19/23 2130  Urinary Catheter Care  Every Shift      See Valeriece for full Linked Orders Report.    10/19/23 2139    10/19/23 2130  NPO Diet NPO Type: Ice Chips  Diet Effective Now         10/19/23 2139    10/19/23 2130  CBC & Differential  STAT         10/19/23 2139    10/19/23 2130  Urine Drug Screen - Urine, Clean Catch  STAT         10/19/23 2139    10/19/23 2130  Type & Screen  STAT         10/19/23 2139    10/19/23 2130  Insert Peripheral IV  Once         10/19/23 2139    10/19/23 2130  Saline Lock & Maintain IV Access  Continuous         10/19/23 2139    10/19/23 2130  CBC Auto Differential   PROCEDURE ONCE         10/19/23 2139    10/19/23 2129  sodium chloride 0.9 % flush 10 mL  As Needed         10/19/23 2139    10/19/23 2129  sodium chloride 0.9 % infusion 40 mL  As Needed         10/19/23 2139    10/19/23 2129  lidocaine (XYLOCAINE) 1 % injection 0.5 mL  Once As Needed         10/19/23 2139    10/19/23 2129  lactated ringers bolus 1,000 mL  Once As Needed         10/19/23 2139    10/19/23 2129  acetaminophen (TYLENOL) tablet 650 mg  Every 4 Hours PRN         10/19/23 2139    10/19/23 2129  butorphanol (STADOL) injection 1 mg  Every 2 Hours PRN,   Status:  Discontinued         10/19/23 2139    10/19/23 2129  butorphanol (STADOL) injection 2 mg  Every 3 Hours PRN,   Status:  Discontinued         10/19/23 2139    10/19/23 2129  ondansetron (ZOFRAN) tablet 4 mg  Every 6 Hours PRN        See Hyperspace for full Linked Orders Report.    10/19/23 2139    10/19/23 2129  ondansetron (ZOFRAN) injection 4 mg  Every 6 Hours PRN        See Hyperspace for full Linked Orders Report.    10/19/23 2139    10/19/23 2129  terbutaline (BRETHINE) injection 0.2 mg  As Needed         10/19/23 2139    10/19/23 2129  sodium chloride (NS) irrigation solution 1,000 mL  Once As Needed         10/19/23 2139    10/19/23 0000  Group B Streptococcus Culture - Swab, Vaginal/Rectum        Comments: This is an external result entered through the Results Console.      10/19/23 2307    10/19/23 0000  Rubella Antibody, IgG        Comments: This is an external result entered through the Results Console.      10/19/23 2307    Unscheduled  Position Change - For Intra-Uterine Resusitation for Hypertonus, HyperStimulation or Non-Reassuring Fetal Status  As Needed       10/19/23 2139    Unscheduled  Insert Indwelling Urinary Catheter  As Needed      Comments: After epidural PRN.  Perform Nasal Decolonization all patients with castro cath   See Hyperspace for full Linked Orders Report.    10/19/23 2139    --  clindamycin (CLEOCIN) 300 MG capsule   4 Times Daily,   Status:  Discontinued         10/19/23 2127    --  famotidine (PEPCID) 20 MG tablet  2 Times Daily,   Status:  Discontinued         10/19/23 2128    --  fexofenadine (ALLEGRA) 180 MG tablet  Daily,   Status:  Discontinued         10/19/23 2128    --  prenatal vitamin (prenatal, CLASSIC, vitamin) tablet  Daily         10/19/23 2129    Signed and Held  Notify Provider (Specified)  Until Discontinued         Signed and Held    Signed and Held  Vital Signs Per Hospital Policy  Per Hospital Policy         Signed and Held    Signed and Held  Up as Tolerated  Until Discontinued         Signed and Held    Signed and Held  Fundal & Lochia Check  As Needed      Comments: Every 15 Minutes x4, Then Every 30 Minutes x2, Then Every Shift    Signed and Held    Signed and Held  Fundal & Lochia Check  Every Shift       Signed and Held    Signed and Held  Diet: Regular/House Diet; Texture: Regular Texture (IDDSI 7); Fluid Consistency: Thin (IDDSI 0)  Diet Effective Now         Signed and Held    Signed and Held  oxytocin (PITOCIN) 30 units in 0.9% sodium chloride 500 mL (premix)  Once        See Hyperspace for full Linked Orders Report.    Signed and Held    Signed and Held  oxytocin (PITOCIN) 30 units in 0.9% sodium chloride 500 mL (premix)  Continuous        See Hyperspace for full Linked Orders Report.    Signed and Held    Signed and Held  acetaminophen (TYLENOL) tablet 650 mg  Every 4 Hours PRN         Signed and Held    Signed and Held  ibuprofen (ADVIL,MOTRIN) tablet 800 mg  Every 8 Hours Scheduled         Signed and Held    Signed and Held  HYDROcodone-acetaminophen (NORCO) 5-325 MG per tablet 1 tablet  Every 4 Hours PRN         Signed and Held    Signed and Held  methylergonovine (METHERGINE) injection 200 mcg  Once As Needed         Signed and Held    Signed and Held  carboprost (HEMABATE) injection 250 mcg  As Needed         Signed and Held    Signed and Held  miSOPROStol (CYTOTEC) tablet 600 mcg  As  Needed         Signed and Held                  Physician Progress Notes (all)    No notes of this type exist for this encounter.       Consult Notes (all)    No notes of this type exist for this encounter.

## 2023-10-20 NOTE — PROGRESS NOTES
Patient has no complaint.   FHR 140s category 1 tracing.   No contractions in spite of cytotec.     Exam;   Vitals:    10/20/23 1533   BP:    Pulse: 103   Resp:    Temp:    SpO2: 95%      Cx: Closed/long/high  A/P:  Preeclampsia.     Plan of care:  I counseled her on the unfavorable cervix with associated preeclampsia and BMI of 54.74    I recommended  delivery. She prefers continue with induction of labor.

## 2023-10-20 NOTE — NON STRESS TEST
Rivertonalvaro Ding, a  at 36w6d with an GLORIA of 11/10/2023, by Last Menstrual Period, was seen at [facility] LABOR DELIVERY for a nonstress test.    Chief Complaint   Patient presents with    Scheduled Induction     CYTOTEC INDUCTION       Patient Active Problem List   Diagnosis    Pregnancy       Start Time:   Stop Time:     Interpretation A  Nonstress Test Interpretation A: Reactive  Comments A: VERIFIED BY ROCCO WEST RN

## 2023-10-20 NOTE — H&P
Obstetric History and Physical    Chief Complaint   Patient presents with   • Scheduled Induction     CYTOTEC INDUCTION       Subjective     Patient is a 22 y.o. female  currently at 37 weeks gestation who is admitted for induction of labor secondary to preeclampsia. She feels fetal movement and denies headache, scotomata or shortness of breath.     Her prenatal care is complicated by  hypertension  pre-eclampsia.  Her previous obstetric/gynecological history is noted for is non-contributory.    The following portions of the patients history were reviewed and updated as appropriate: current medications, allergies, past medical history, past surgical history, past family history, past social history, and problem list .       Prenatal Information:   Maternal Prenatal Labs  Blood Type ABO Type   Date Value Ref Range Status   10/19/2023 B  Final      Rh Status RH type   Date Value Ref Range Status   10/19/2023 Positive  Final      Antibody Screen Antibody Screen   Date Value Ref Range Status   10/19/2023 Negative  Final      Rapid Urin Drug Screen Barbiturates Screen, Urine   Date Value Ref Range Status   10/19/2023 Negative Negative Final     Benzodiazepine Screen, Urine   Date Value Ref Range Status   10/19/2023 Negative Negative Final     Methadone Screen, Urine   Date Value Ref Range Status   10/19/2023 Negative Negative Final     Opiate Screen   Date Value Ref Range Status   10/19/2023 Negative Negative Final     THC, Screen, Urine   Date Value Ref Range Status   10/19/2023 Negative Negative Final     Cocaine Screen, Urine   Date Value Ref Range Status   10/19/2023 Negative Negative Final     Amphetamine Screen, Urine   Date Value Ref Range Status   10/19/2023 Negative Negative Final     Buprenorphine, Screen, Urine   Date Value Ref Range Status   10/19/2023 Negative Negative Final     Methamphetamine, Ur   Date Value Ref Range Status   10/19/2023 Negative Negative Final     Oxycodone Screen, Urine   Date Value  "Ref Range Status   10/19/2023 Negative Negative Final     Tricyclic Antidepressants Screen   Date Value Ref Range Status   10/19/2023 Negative Negative Final      Group B Strep Culture No results found for: \"GBSANTIGEN\"           External Prenatal Results       Pregnancy Outside Results - Transcribed From Office Records - See Scanned Records For Details       Test Value Date Time    ABO  A  10/14/21 1822    Rh  Positive  10/14/21 1822    Antibody Screen  Negative  10/14/21 1822       Negative  10/11/21 0715    Varicella IgG       Rubella ^ Immune  04/08/21     Hgb  9.8 g/dL 10/14/21 1822       9.7 g/dL 10/11/21 0715       11.4 g/dL 04/21/21 0505       12.5 g/dL 02/23/21 0909    Hct  30.7 % 10/14/21 1822       30.2 % 10/11/21 0715       34.4 % 04/21/21 0505       37.6 % 02/23/21 0909    Glucose Fasting GTT       Glucose Tolerance Test 1 hour ^ 114  07/23/21     Glucose Tolerance Test 3 hour       Gonorrhea (discrete) ^ NEG  09/17/21     Chlamydia (discrete) ^ NEG  09/17/21     RPR ^ Non-Reactive  04/08/21     VDRL       Syphilis Antibody       HBsAg ^ Negative  04/08/21     Herpes Simplex Virus PCR       Herpes Simplex VIrus Culture       HIV ^ Negative  04/08/21     Hep C RNA Quant PCR       Hep C Antibody       AFP       Group B Strep ^ NEG  09/16/21     GBS Susceptibility to Clindamycin       GBS Susceptibility to Erythromycin       Fetal Fibronectin       Genetic Testing, Maternal Blood                 Drug Screening       Test Value Date Time    Urine Drug Screen       Amphetamine Screen  Negative  08/26/18 1157    Barbiturate Screen  Negative  08/26/18 1157    Benzodiazepine Screen  Negative  08/26/18 1157    Methadone Screen  Negative  08/26/18 1157    Phencyclidine Screen  Negative  08/26/18 1157    Opiates Screen  Negative  08/26/18 1157    THC Screen  Negative  08/26/18 1157    Cocaine Screen       Propoxyphene Screen       Buprenorphine Screen  Negative  08/26/18 1157    Methamphetamine Screen       " Oxycodone Screen  Negative  18 1157    Tricyclic Antidepressants Screen                 Legend    ^: Historical                              Past OB History:     OB History    Para Term  AB Living   1 0 0 0 0 0   SAB IAB Ectopic Molar Multiple Live Births   0 0 0 0 0 0      # Outcome Date GA Lbr Armando/2nd Weight Sex Delivery Anes PTL Lv   1 Current                Past Medical History: Past Medical History:   Diagnosis Date   • Kidney stones    • Ovarian cyst       Past Surgical History Past Surgical History:   Procedure Laterality Date   • CYST REMOVAL      TAILBONE   • LAPAROSCOPIC CHOLECYSTECTOMY     • TONSILLECTOMY     • TYMPANOSTOMY TUBE PLACEMENT     • WISDOM TOOTH EXTRACTION        Family History: Family History   Problem Relation Age of Onset   • Hypertension Mother    • Hyperlipidemia Mother    • Nephrolithiasis Father    • Thyroid disease Sister    • Diabetes Paternal Aunt    • Diabetes Paternal Grandmother       Social History:  reports that she has never smoked. She has never used smokeless tobacco.   reports no history of alcohol use.   reports no history of drug use.        Review of Systems   Constitutional:  Positive for fatigue.   Cardiovascular:  Positive for leg swelling.   Gastrointestinal:  Positive for abdominal pain.   Genitourinary:  Positive for pelvic pain.   Musculoskeletal:  Positive for back pain.   Neurological:  Positive for dizziness.   All other systems reviewed and are negative.          Objective     Vital Signs Range for the last 24 hours  Temperature: Temp:  [96.3 °F (35.7 °C)-98.6 °F (37 °C)] 98.6 °F (37 °C)   Temp Source: Temp src: Axillary   BP: BP: (115-137)/(55-75) 137/73   Pulse: Heart Rate:  [] 90   Respirations: Resp:  [18] 18   Weight: Weight:  [163 kg (360 lb)] 163 kg (360 lb)     Physical Examination:     Physical exam:    Constitutional:  Well-developed and well-nourished.  No respiratory distress.      HENT:  Head:  Normocephalic and  "atraumatic.  Mouth:  Moist mucous membranes.    Eyes:  Conjunctivae and EOM are normal.  No scleral icterus.    Neck:  Neck supple.  No JVD present.    Cardiovascular:  Normal rate, regular rhythm and normal heart sounds with no murmur. No edema.  Pulmonary/Chest:  No respiratory distress, no wheezes, no crackles, with normal breath sounds and good air movement.  Abdominal:  Soft.  FH 38 cm, FHR 130s category 1 tracing  Musculoskeletal:  No edema, no tenderness, and no deformity.  No red or swollen joints anywhere.    Neurological:  Alert and oriented to person, place, and time. No facial droop.  No slurred speech.   Skin:  Skin is warm and dry. No rash noted. No pallor.   Pelvic Exam: normal external female genitalia, v    Presentation: Cephalic Late entry, exam was done at 9:00 AM.    Cervix: Exam by:   Dr Harrell   Dilation:  2/60/-2   Effacement:     Station:  -2     Laboratory Results:   CBC and coagulation:  Results from last 7 days   Lab Units 10/19/23  2152 10/18/23  1049   WBC 10*3/mm3 9.25 9.30   HEMOGLOBIN g/dL 12.3 12.0   HEMATOCRIT % 37.3 37.3   MCV fL 87.8 88.2   MCHC g/dL 33.0 32.2   PLATELETS 10*3/mm3 249 227       Renal and electrolytes:  Results from last 7 days   Lab Units 10/18/23  1049   SODIUM mmol/L 138   POTASSIUM mmol/L 3.7   CHLORIDE mmol/L 106   CO2 mmol/L 20.0*   BUN mg/dL 11   CREATININE mg/dL 0.44*   CALCIUM mg/dL 9.3   GLUCOSE mg/dL 114*     Estimated Creatinine Clearance: 323.7 mL/min (A) (by C-G formula based on SCr of 0.44 mg/dL (L)).    Liver and pancreatic function:  Results from last 7 days   Lab Units 10/18/23  1049   ALBUMIN g/dL 3.5   BILIRUBIN mg/dL 0.2   ALK PHOS U/L 96   AST (SGOT) U/L 17   ALT (SGPT) U/L 18     Endocrine function:  No results found for: \"HGBA1C\"  Point of care bedside glucose levels:      No results found for: \"TSH\", \"FREET4\"  Cardiac:        Cultures:  Lab Results   Component Value Date    COLORU Yellow 11/11/2019    CLARITYU Cloudy (A) 11/11/2019    PHCHEKO " <=5.0 11/11/2019    GLUCOSEU Negative 11/11/2019    KETONESU Trace (A) 11/11/2019    BLOODU Moderate (2+) (A) 11/11/2019    NITRITEU Negative 11/11/2019    LEUKOCYTESUR Small (1+) (A) 11/11/2019    BILIRUBINUR Negative 11/11/2019    UROBILINOGEN 1.0 E.U./dL 11/11/2019    RBCUA 3-5 (A) 11/11/2019    WBCUA 31-50 (A) 11/11/2019    BACTERIA 2+ (A) 11/11/2019     Microbiology Results (last 10 days)       Procedure Component Value - Date/Time    Group B Streptococcus Culture - Swab, Vaginal/Rectum [[id number]] Resulted: 10/19/23    Lab Status: Final result Specimen: Swab from Vaginal/Rectum Updated: 10/19/23 2307     External Strep Group B Ag Unknown            Lab Results   Component Value Date    PREGTESTUR Negative 11/11/2019     Pain Management Panel  More data may exist         Latest Ref Rng & Units 10/19/2023 6/17/2017   Pain Management Panel   Amphetamine, Urine Qual Negative Negative  Negative    Barbiturates Screen, Urine Negative Negative  Negative    Benzodiazepine Screen, Urine Negative Negative  Negative    Buprenorphine, Screen, Urine Negative Negative  Negative    Cocaine Screen, Urine Negative Negative  Negative    Fentanyl, Urine Negative Negative  -   Methadone Screen , Urine Negative Negative  Negative    Methamphetamine, Ur Negative Negative  -       Radiology Review:  US Fetal Biophysical Profile;Without Non-Stress Testing    Result Date: 10/18/2023    BPP 8 out of 8.   This report was finalized on 10/18/2023 1:57 PM by Dr. Osman Yanes MD.         Assessment & Plan       Pregnancy        Assessment:  1.  Intrauterine pregnancy at 40w0d weeks gestation with reassuring fetal status.    2.  Preeclampsia  3.  GBS status: negative.   Plan:  1. Continue with induction with pitocin.  AROM performed clear.   2. Plan of care has been reviewed with patient and she agreed with the plan of care. 3.  Risks, benefits of treatment plan have been discussed.  4.  All questions have been answered.

## 2023-10-21 ENCOUNTER — ANESTHESIA EVENT (OUTPATIENT)
Dept: LABOR AND DELIVERY | Facility: HOSPITAL | Age: 23
End: 2023-10-21
Payer: COMMERCIAL

## 2023-10-21 ENCOUNTER — ANESTHESIA (OUTPATIENT)
Dept: LABOR AND DELIVERY | Facility: HOSPITAL | Age: 23
End: 2023-10-21
Payer: COMMERCIAL

## 2023-10-21 PROBLEM — O14.00 ANTEPARTUM MILD PREECLAMPSIA: Status: ACTIVE | Noted: 2023-10-21

## 2023-10-21 PROBLEM — O61.0 FAILED MEDICAL INDUCTION OF LABOR: Status: ACTIVE | Noted: 2023-10-21

## 2023-10-21 PROBLEM — Z3A.37 37 WEEKS GESTATION OF PREGNANCY: Status: ACTIVE | Noted: 2023-10-21

## 2023-10-21 PROCEDURE — 25010000002 BUPIVACAINE PF 0.75 % SOLUTION: Performed by: ANESTHESIOLOGY

## 2023-10-21 PROCEDURE — 25010000002 DEXAMETHASONE PER 1 MG: Performed by: ANESTHESIOLOGY

## 2023-10-21 PROCEDURE — 25010000002 ONDANSETRON PER 1 MG: Performed by: ANESTHESIOLOGY

## 2023-10-21 PROCEDURE — 59025 FETAL NON-STRESS TEST: CPT

## 2023-10-21 PROCEDURE — 25010000002 MORPHINE PER 10 MG: Performed by: ANESTHESIOLOGY

## 2023-10-21 PROCEDURE — 25010000002 CEFAZOLIN PER 500 MG: Performed by: OBSTETRICS & GYNECOLOGY

## 2023-10-21 PROCEDURE — 25010000002 OXYTOCIN PER 10 UNITS: Performed by: OBSTETRICS & GYNECOLOGY

## 2023-10-21 PROCEDURE — 25810000003 LACTATED RINGERS PER 1000 ML: Performed by: OBSTETRICS & GYNECOLOGY

## 2023-10-21 PROCEDURE — 25810000003 LACTATED RINGERS PER 1000 ML: Performed by: ANESTHESIOLOGY

## 2023-10-21 PROCEDURE — 25810000003 LACTATED RINGERS SOLUTION: Performed by: OBSTETRICS & GYNECOLOGY

## 2023-10-21 PROCEDURE — 25010000002 PROCHLORPERAZINE 10 MG/2ML SOLUTION: Performed by: ANESTHESIOLOGY

## 2023-10-21 PROCEDURE — 25810000003 SODIUM CHLORIDE 0.9 % SOLUTION: Performed by: OBSTETRICS & GYNECOLOGY

## 2023-10-21 RX ORDER — DEXMEDETOMIDINE HYDROCHLORIDE 4 UG/ML
INJECTION, SOLUTION INTRAVENOUS AS NEEDED
Status: DISCONTINUED | OUTPATIENT
Start: 2023-10-21 | End: 2023-10-21 | Stop reason: SURG

## 2023-10-21 RX ORDER — OXYTOCIN/0.9 % SODIUM CHLORIDE 30/500 ML
999 PLASTIC BAG, INJECTION (ML) INTRAVENOUS ONCE
Status: DISCONTINUED | OUTPATIENT
Start: 2023-10-21 | End: 2023-10-21 | Stop reason: HOSPADM

## 2023-10-21 RX ORDER — IBUPROFEN 600 MG/1
600 TABLET ORAL EVERY 6 HOURS
Status: DISCONTINUED | OUTPATIENT
Start: 2023-10-25 | End: 2023-10-23 | Stop reason: HOSPADM

## 2023-10-21 RX ORDER — ONDANSETRON 2 MG/ML
4 INJECTION INTRAMUSCULAR; INTRAVENOUS EVERY 6 HOURS PRN
Status: DISCONTINUED | OUTPATIENT
Start: 2023-10-21 | End: 2023-10-23 | Stop reason: HOSPADM

## 2023-10-21 RX ORDER — ONDANSETRON 2 MG/ML
4 INJECTION INTRAMUSCULAR; INTRAVENOUS EVERY 6 HOURS PRN
Status: DISCONTINUED | OUTPATIENT
Start: 2023-10-21 | End: 2023-10-21 | Stop reason: HOSPADM

## 2023-10-21 RX ORDER — PROMETHAZINE HYDROCHLORIDE 25 MG/1
12.5 TABLET ORAL EVERY 6 HOURS PRN
Status: DISCONTINUED | OUTPATIENT
Start: 2023-10-21 | End: 2023-10-21 | Stop reason: HOSPADM

## 2023-10-21 RX ORDER — OXYTOCIN/0.9 % SODIUM CHLORIDE 30/500 ML
PLASTIC BAG, INJECTION (ML) INTRAVENOUS CONTINUOUS PRN
Status: DISCONTINUED | OUTPATIENT
Start: 2023-10-21 | End: 2023-10-21 | Stop reason: SURG

## 2023-10-21 RX ORDER — MAGNESIUM HYDROXIDE 1200 MG/15ML
3000 LIQUID ORAL ONCE AS NEEDED
Status: DISCONTINUED | OUTPATIENT
Start: 2023-10-21 | End: 2023-10-21

## 2023-10-21 RX ORDER — HYDROXYZINE HYDROCHLORIDE 25 MG/1
50 TABLET, FILM COATED ORAL EVERY 6 HOURS PRN
Status: DISCONTINUED | OUTPATIENT
Start: 2023-10-21 | End: 2023-10-23 | Stop reason: HOSPADM

## 2023-10-21 RX ORDER — ACETAMINOPHEN 500 MG
1000 TABLET ORAL EVERY 6 HOURS
Status: DISPENSED | OUTPATIENT
Start: 2023-10-21 | End: 2023-10-22

## 2023-10-21 RX ORDER — ACETAMINOPHEN 500 MG
1000 TABLET ORAL ONCE
Status: COMPLETED | OUTPATIENT
Start: 2023-10-21 | End: 2023-10-21

## 2023-10-21 RX ORDER — IBUPROFEN 800 MG/1
800 TABLET ORAL EVERY 8 HOURS SCHEDULED
Status: DISCONTINUED | OUTPATIENT
Start: 2023-10-21 | End: 2023-10-21 | Stop reason: HOSPADM

## 2023-10-21 RX ORDER — MAGNESIUM HYDROXIDE 1200 MG/15ML
LIQUID ORAL AS NEEDED
Status: DISCONTINUED | OUTPATIENT
Start: 2023-10-21 | End: 2023-10-23 | Stop reason: HOSPADM

## 2023-10-21 RX ORDER — SODIUM CHLORIDE, SODIUM LACTATE, POTASSIUM CHLORIDE, CALCIUM CHLORIDE 600; 310; 30; 20 MG/100ML; MG/100ML; MG/100ML; MG/100ML
INJECTION, SOLUTION INTRAVENOUS CONTINUOUS PRN
Status: DISCONTINUED | OUTPATIENT
Start: 2023-10-21 | End: 2023-10-21 | Stop reason: SURG

## 2023-10-21 RX ORDER — MORPHINE SULFATE 0.5 MG/ML
INJECTION, SOLUTION EPIDURAL; INTRATHECAL; INTRAVENOUS AS NEEDED
Status: DISCONTINUED | OUTPATIENT
Start: 2023-10-21 | End: 2023-10-21 | Stop reason: SURG

## 2023-10-21 RX ORDER — BUPIVACAINE HYDROCHLORIDE 7.5 MG/ML
INJECTION, SOLUTION EPIDURAL; RETROBULBAR
Status: COMPLETED | OUTPATIENT
Start: 2023-10-21 | End: 2023-10-21

## 2023-10-21 RX ORDER — OXYTOCIN/0.9 % SODIUM CHLORIDE 30/500 ML
250 PLASTIC BAG, INJECTION (ML) INTRAVENOUS CONTINUOUS
Status: ACTIVE | OUTPATIENT
Start: 2023-10-21 | End: 2023-10-21

## 2023-10-21 RX ORDER — METHYLERGONOVINE MALEATE 0.2 MG/ML
200 INJECTION INTRAVENOUS ONCE AS NEEDED
Status: DISCONTINUED | OUTPATIENT
Start: 2023-10-21 | End: 2023-10-23 | Stop reason: HOSPADM

## 2023-10-21 RX ORDER — HYDROCORTISONE 25 MG/G
CREAM TOPICAL 3 TIMES DAILY PRN
Status: DISCONTINUED | OUTPATIENT
Start: 2023-10-21 | End: 2023-10-23 | Stop reason: HOSPADM

## 2023-10-21 RX ORDER — CARBOPROST TROMETHAMINE 250 UG/ML
250 INJECTION, SOLUTION INTRAMUSCULAR AS NEEDED
Status: DISCONTINUED | OUTPATIENT
Start: 2023-10-21 | End: 2023-10-21 | Stop reason: HOSPADM

## 2023-10-21 RX ORDER — METHYLERGONOVINE MALEATE 0.2 MG/ML
200 INJECTION INTRAVENOUS ONCE AS NEEDED
Status: DISCONTINUED | OUTPATIENT
Start: 2023-10-21 | End: 2023-10-21 | Stop reason: HOSPADM

## 2023-10-21 RX ORDER — MISOPROSTOL 100 UG/1
600 TABLET ORAL AS NEEDED
Status: DISCONTINUED | OUTPATIENT
Start: 2023-10-21 | End: 2023-10-21 | Stop reason: HOSPADM

## 2023-10-21 RX ORDER — PRENATAL VIT/IRON FUM/FOLIC AC 27MG-0.8MG
1 TABLET ORAL DAILY
Status: DISCONTINUED | OUTPATIENT
Start: 2023-10-22 | End: 2023-10-23 | Stop reason: HOSPADM

## 2023-10-21 RX ORDER — OXYCODONE HYDROCHLORIDE 5 MG/1
5 TABLET ORAL EVERY 4 HOURS PRN
Status: DISPENSED | OUTPATIENT
Start: 2023-10-21 | End: 2023-10-23

## 2023-10-21 RX ORDER — KETOROLAC TROMETHAMINE 30 MG/ML
30 INJECTION, SOLUTION INTRAMUSCULAR; INTRAVENOUS EVERY 6 HOURS PRN
Status: DISCONTINUED | OUTPATIENT
Start: 2023-10-21 | End: 2023-10-23 | Stop reason: HOSPADM

## 2023-10-21 RX ORDER — DIPHENHYDRAMINE HCL 25 MG
25 CAPSULE ORAL EVERY 4 HOURS PRN
Status: DISCONTINUED | OUTPATIENT
Start: 2023-10-21 | End: 2023-10-23 | Stop reason: HOSPADM

## 2023-10-21 RX ORDER — PROCHLORPERAZINE EDISYLATE 5 MG/ML
INJECTION INTRAMUSCULAR; INTRAVENOUS AS NEEDED
Status: DISCONTINUED | OUTPATIENT
Start: 2023-10-21 | End: 2023-10-21 | Stop reason: SURG

## 2023-10-21 RX ORDER — KETOROLAC TROMETHAMINE 30 MG/ML
30 INJECTION, SOLUTION INTRAMUSCULAR; INTRAVENOUS ONCE
Status: DISCONTINUED | OUTPATIENT
Start: 2023-10-21 | End: 2023-10-21 | Stop reason: HOSPADM

## 2023-10-21 RX ORDER — DIPHENHYDRAMINE HYDROCHLORIDE 50 MG/ML
25 INJECTION INTRAMUSCULAR; INTRAVENOUS EVERY 4 HOURS PRN
Status: DISCONTINUED | OUTPATIENT
Start: 2023-10-21 | End: 2023-10-23 | Stop reason: HOSPADM

## 2023-10-21 RX ORDER — ACETAMINOPHEN 325 MG/1
650 TABLET ORAL EVERY 4 HOURS PRN
Status: DISCONTINUED | OUTPATIENT
Start: 2023-10-21 | End: 2023-10-21 | Stop reason: HOSPADM

## 2023-10-21 RX ORDER — MISOPROSTOL 100 UG/1
600 TABLET ORAL ONCE AS NEEDED
Status: DISCONTINUED | OUTPATIENT
Start: 2023-10-21 | End: 2023-10-23 | Stop reason: HOSPADM

## 2023-10-21 RX ORDER — HYDROXYZINE HYDROCHLORIDE 25 MG/1
50 TABLET, FILM COATED ORAL NIGHTLY PRN
Status: DISCONTINUED | OUTPATIENT
Start: 2023-10-21 | End: 2023-10-21 | Stop reason: HOSPADM

## 2023-10-21 RX ORDER — ONDANSETRON 2 MG/ML
4 INJECTION INTRAMUSCULAR; INTRAVENOUS ONCE AS NEEDED
Status: DISCONTINUED | OUTPATIENT
Start: 2023-10-21 | End: 2023-10-23 | Stop reason: HOSPADM

## 2023-10-21 RX ORDER — HYDROCODONE BITARTRATE AND ACETAMINOPHEN 5; 325 MG/1; MG/1
1 TABLET ORAL EVERY 4 HOURS PRN
Status: DISCONTINUED | OUTPATIENT
Start: 2023-10-21 | End: 2023-10-21 | Stop reason: HOSPADM

## 2023-10-21 RX ORDER — OXYCODONE HYDROCHLORIDE 10 MG/1
10 TABLET ORAL EVERY 6 HOURS PRN
Status: DISPENSED | OUTPATIENT
Start: 2023-10-21 | End: 2023-10-23

## 2023-10-21 RX ORDER — ACETAMINOPHEN 325 MG/1
650 TABLET ORAL EVERY 6 HOURS
Status: DISCONTINUED | OUTPATIENT
Start: 2023-10-22 | End: 2023-10-23 | Stop reason: HOSPADM

## 2023-10-21 RX ORDER — ONDANSETRON 2 MG/ML
INJECTION INTRAMUSCULAR; INTRAVENOUS AS NEEDED
Status: DISCONTINUED | OUTPATIENT
Start: 2023-10-21 | End: 2023-10-21 | Stop reason: SURG

## 2023-10-21 RX ORDER — DEXAMETHASONE SODIUM PHOSPHATE 4 MG/ML
INJECTION, SOLUTION INTRA-ARTICULAR; INTRALESIONAL; INTRAMUSCULAR; INTRAVENOUS; SOFT TISSUE AS NEEDED
Status: DISCONTINUED | OUTPATIENT
Start: 2023-10-21 | End: 2023-10-21 | Stop reason: SURG

## 2023-10-21 RX ORDER — SIMETHICONE 80 MG
80 TABLET,CHEWABLE ORAL 4 TIMES DAILY
Status: DISCONTINUED | OUTPATIENT
Start: 2023-10-21 | End: 2023-10-23 | Stop reason: HOSPADM

## 2023-10-21 RX ORDER — DOCUSATE SODIUM 100 MG/1
100 CAPSULE, LIQUID FILLED ORAL 2 TIMES DAILY
Status: DISCONTINUED | OUTPATIENT
Start: 2023-10-22 | End: 2023-10-23 | Stop reason: HOSPADM

## 2023-10-21 RX ORDER — OXYTOCIN/0.9 % SODIUM CHLORIDE 30/500 ML
999 PLASTIC BAG, INJECTION (ML) INTRAVENOUS ONCE
Status: DISCONTINUED | OUTPATIENT
Start: 2023-10-21 | End: 2023-10-23 | Stop reason: HOSPADM

## 2023-10-21 RX ORDER — OXYTOCIN/0.9 % SODIUM CHLORIDE 30/500 ML
125 PLASTIC BAG, INJECTION (ML) INTRAVENOUS CONTINUOUS PRN
Status: DISCONTINUED | OUTPATIENT
Start: 2023-10-21 | End: 2023-10-23 | Stop reason: HOSPADM

## 2023-10-21 RX ORDER — PROMETHAZINE HYDROCHLORIDE 12.5 MG/1
12.5 SUPPOSITORY RECTAL EVERY 6 HOURS PRN
Status: DISCONTINUED | OUTPATIENT
Start: 2023-10-21 | End: 2023-10-21 | Stop reason: HOSPADM

## 2023-10-21 RX ORDER — METOCLOPRAMIDE HYDROCHLORIDE 5 MG/ML
10 INJECTION INTRAMUSCULAR; INTRAVENOUS EVERY 6 HOURS PRN
Status: DISCONTINUED | OUTPATIENT
Start: 2023-10-21 | End: 2023-10-21 | Stop reason: HOSPADM

## 2023-10-21 RX ORDER — PROCHLORPERAZINE EDISYLATE 5 MG/ML
10 INJECTION INTRAMUSCULAR; INTRAVENOUS EVERY 6 HOURS PRN
Status: DISCONTINUED | OUTPATIENT
Start: 2023-10-21 | End: 2023-10-23 | Stop reason: HOSPADM

## 2023-10-21 RX ORDER — ONDANSETRON 4 MG/1
4 TABLET, FILM COATED ORAL EVERY 8 HOURS PRN
Status: DISCONTINUED | OUTPATIENT
Start: 2023-10-21 | End: 2023-10-23 | Stop reason: HOSPADM

## 2023-10-21 RX ORDER — NALOXONE HCL 0.4 MG/ML
0.1 VIAL (ML) INJECTION
Status: ACTIVE | OUTPATIENT
Start: 2023-10-21 | End: 2023-10-22

## 2023-10-21 RX ORDER — ONDANSETRON 4 MG/1
4 TABLET, FILM COATED ORAL EVERY 6 HOURS PRN
Status: DISCONTINUED | OUTPATIENT
Start: 2023-10-21 | End: 2023-10-21 | Stop reason: HOSPADM

## 2023-10-21 RX ORDER — METHYLERGONOVINE MALEATE 0.2 MG/ML
200 INJECTION INTRAVENOUS AS NEEDED
Status: DISCONTINUED | OUTPATIENT
Start: 2023-10-21 | End: 2023-10-21 | Stop reason: HOSPADM

## 2023-10-21 RX ORDER — PHENYLEPHRINE HCL IN 0.9% NACL 1 MG/10 ML
SYRINGE (ML) INTRAVENOUS AS NEEDED
Status: DISCONTINUED | OUTPATIENT
Start: 2023-10-21 | End: 2023-10-21 | Stop reason: SURG

## 2023-10-21 RX ORDER — ONDANSETRON 4 MG/1
4 TABLET, FILM COATED ORAL EVERY 6 HOURS PRN
Status: DISCONTINUED | OUTPATIENT
Start: 2023-10-21 | End: 2023-10-23 | Stop reason: HOSPADM

## 2023-10-21 RX ORDER — MORPHINE SULFATE 2 MG/ML
2 INJECTION, SOLUTION INTRAMUSCULAR; INTRAVENOUS
Status: DISCONTINUED | OUTPATIENT
Start: 2023-10-21 | End: 2023-10-23 | Stop reason: HOSPADM

## 2023-10-21 RX ORDER — CARBOPROST TROMETHAMINE 250 UG/ML
250 INJECTION, SOLUTION INTRAMUSCULAR ONCE
Status: DISCONTINUED | OUTPATIENT
Start: 2023-10-21 | End: 2023-10-23 | Stop reason: HOSPADM

## 2023-10-21 RX ADMIN — Medication 250 ML/HR: at 10:32

## 2023-10-21 RX ADMIN — PROCHLORPERAZINE EDISYLATE 5 MG: 5 INJECTION INTRAMUSCULAR; INTRAVENOUS at 09:37

## 2023-10-21 RX ADMIN — ONDANSETRON 4 MG: 2 INJECTION INTRAMUSCULAR; INTRAVENOUS at 09:12

## 2023-10-21 RX ADMIN — Medication 999 ML/HR: at 10:00

## 2023-10-21 RX ADMIN — ONDANSETRON 4 MG: 2 INJECTION INTRAMUSCULAR; INTRAVENOUS at 09:37

## 2023-10-21 RX ADMIN — SIMETHICONE 80 MG: 80 TABLET, CHEWABLE ORAL at 17:59

## 2023-10-21 RX ADMIN — CEFAZOLIN 3000 MG: 1 INJECTION, POWDER, FOR SOLUTION INTRAMUSCULAR; INTRAVENOUS; PARENTERAL at 08:50

## 2023-10-21 RX ADMIN — Medication 100 MCG: at 09:01

## 2023-10-21 RX ADMIN — ACETAMINOPHEN 1000 MG: 500 TABLET ORAL at 08:27

## 2023-10-21 RX ADMIN — SODIUM CHLORIDE, POTASSIUM CHLORIDE, SODIUM LACTATE AND CALCIUM CHLORIDE 125 ML/HR: 600; 310; 30; 20 INJECTION, SOLUTION INTRAVENOUS at 16:35

## 2023-10-21 RX ADMIN — MORPHINE SULFATE 0.2 MG: 0.5 INJECTION EPIDURAL; INTRATHECAL; INTRAVENOUS at 08:52

## 2023-10-21 RX ADMIN — Medication 999 ML/HR: at 09:12

## 2023-10-21 RX ADMIN — DEXAMETHASONE SODIUM PHOSPHATE 8 MG: 4 INJECTION INTRA-ARTICULAR; INTRALESIONAL; INTRAMUSCULAR; INTRAVENOUS; SOFT TISSUE at 09:12

## 2023-10-21 RX ADMIN — ACETAMINOPHEN 1000 MG: 500 TABLET ORAL at 17:59

## 2023-10-21 RX ADMIN — Medication 100 MCG: at 08:54

## 2023-10-21 RX ADMIN — SIMETHICONE 80 MG: 80 TABLET, CHEWABLE ORAL at 23:17

## 2023-10-21 RX ADMIN — MORPHINE SULFATE 4.8 MG: 0.5 INJECTION EPIDURAL; INTRATHECAL; INTRAVENOUS at 09:19

## 2023-10-21 RX ADMIN — ACETAMINOPHEN 1000 MG: 500 TABLET ORAL at 23:17

## 2023-10-21 RX ADMIN — DEXMEDETOMIDINE HYDROCHLORIDE 16 MCG: 4 INJECTION, SOLUTION INTRAVENOUS at 09:41

## 2023-10-21 RX ADMIN — SODIUM CHLORIDE, POTASSIUM CHLORIDE, SODIUM LACTATE AND CALCIUM CHLORIDE 125 ML/HR: 600; 310; 30; 20 INJECTION, SOLUTION INTRAVENOUS at 07:41

## 2023-10-21 RX ADMIN — SODIUM CHLORIDE, POTASSIUM CHLORIDE, SODIUM LACTATE AND CALCIUM CHLORIDE 1000 ML: 600; 310; 30; 20 INJECTION, SOLUTION INTRAVENOUS at 08:27

## 2023-10-21 RX ADMIN — SODIUM CHLORIDE, POTASSIUM CHLORIDE, SODIUM LACTATE AND CALCIUM CHLORIDE: 600; 310; 30; 20 INJECTION, SOLUTION INTRAVENOUS at 08:50

## 2023-10-21 RX ADMIN — BUPIVACAINE HYDROCHLORIDE 1.8 ML: 7.5 INJECTION, SOLUTION EPIDURAL; RETROBULBAR at 08:53

## 2023-10-21 NOTE — ANESTHESIA PREPROCEDURE EVALUATION
Anesthesia Evaluation     Patient summary reviewed and Nursing notes reviewed   no history of anesthetic complications:   NPO Solid Status: > 8 hours  NPO Liquid Status: > 8 hours           Airway   Mallampati: III  TM distance: >3 FB  Neck ROM: full  Possible difficult intubation and Large neck circumference  Dental - normal exam     Pulmonary - negative pulmonary ROS   (+) ,decreased breath sounds  Cardiovascular - negative cardio ROS and normal exam    Rhythm: regular  Rate: normal        Neuro/Psych- negative ROS  GI/Hepatic/Renal/Endo    (+) obesity, morbid obesity (super morbid obesity), renal disease-    Musculoskeletal (-) negative ROS    Abdominal   (+) obese   Substance History - negative use     OB/GYN    (+) Pregnant (IUP 37 1/7 weeks gest)        Other - negative ROS                     Anesthesia Plan    ASA 4     spinal and ITN       Anesthetic plan, risks, benefits, and alternatives have been provided, discussed and informed consent has been obtained with: spouse/significant other and patient.  Pre-procedure education provided  Use of blood products discussed with patient  Consented to blood products.    Plan discussed with CRNA.    CODE STATUS:    Level Of Support Discussed With: Patient  Code Status (Patient has no pulse and is not breathing): CPR (Attempt to Resuscitate)  Medical Interventions (Patient has pulse or is breathing): Full Support

## 2023-10-21 NOTE — NON STRESS TEST
Angie Ding, a  at 37w1d with an GLORIA of 11/10/2023, by Last Menstrual Period, was seen at [facility] LABOR DELIVERY for a nonstress test.    Chief Complaint   Patient presents with    Scheduled Induction     CYTOTEC INDUCTION       Patient Active Problem List   Diagnosis    Pregnancy       Start Time: 804  Stop Time: 824    REACTIVE  Christelle Aviles RN

## 2023-10-21 NOTE — PLAN OF CARE
Goal Outcome Evaluation:  Plan of Care Reviewed With: patient        Progress: improving  Outcome Evaluation: vitals stable/ tolerating reg diet/ adequate urine output

## 2023-10-21 NOTE — ANESTHESIA POSTPROCEDURE EVALUATION
Patient: Angie Ding    Procedure Summary       Date: 10/21/23 Room / Location:  [facility] LABOR DELIVERY   [facility] LABOR DELIVERY    Anesthesia Start: 0850 Anesthesia Stop:     Procedure:  SECTION PRIMARY (Bilateral: Abdomen) Diagnosis: (PRIMARY  SECTION)    Surgeons: Mejia Harrell MD Provider: Gunnar Urbina DO    Anesthesia Type: spinal, ITN ASA Status: 4            Anesthesia Type: spinal, ITN    Vitals  Vitals Value Taken Time   /72 10/21/23 1025   Temp 98.4 °F (36.9 °C) 10/21/23 1020   Pulse 89 10/21/23 1032   Resp 18 10/21/23 1020   SpO2 97 % 10/21/23 1032   Vitals shown include unfiled device data.        Post Anesthesia Care and Evaluation    Patient location during evaluation: PACU  Patient participation: complete - patient participated  Level of consciousness: awake and alert  Pain score: 0  Pain management: adequate    Airway patency: patent  Anesthetic complications: No anesthetic complications  PONV Status: controlled  Cardiovascular status: acceptable  Respiratory status: acceptable and room air  Hydration status: euvolemic  Post Neuraxial Block status: No signs or symptoms of PDPHNo anesthesia care post op

## 2023-10-21 NOTE — PLAN OF CARE
Problem: Adult Inpatient Plan of Care  Goal: Plan of Care Review  Outcome: Ongoing, Progressing  Flowsheets (Taken 10/21/2023 1007)  Progress: no change  Plan of Care Reviewed With: patient  Goal: Patient-Specific Goal (Individualized)  Outcome: Ongoing, Progressing  Goal: Absence of Hospital-Acquired Illness or Injury  Outcome: Ongoing, Progressing  Goal: Optimal Comfort and Wellbeing  Outcome: Ongoing, Progressing  Intervention: Provide Person-Centered Care  Recent Flowsheet Documentation  Taken 10/21/2023 0730 by Christelle Aviles, RN  Trust Relationship/Rapport:   questions encouraged   questions answered   reassurance provided  Goal: Readiness for Transition of Care  Outcome: Ongoing, Progressing   Goal Outcome Evaluation:  Plan of Care Reviewed With: patient        Progress: no change

## 2023-10-21 NOTE — ANESTHESIA PROCEDURE NOTES
Spinal Block    Pre-sedation assessment completed: 10/21/2023 8:52 AM    Patient reassessed immediately prior to procedure    Patient location during procedure: OR  Start Time: 10/21/2023 8:53 AM  Stop Time: 10/21/2023 8:53 AM  Indication:procedure for pain  Performed By  Anesthesiologist: Gunnar Urbina DO  Spinal Block Prep:  Patient Position:sitting  Sterile Tech:gloves, mask and sterile barriers  Prep:Chloraprep  Patient Monitoring:blood pressure monitoring, continuous pulse oximetry and EKG    Spinal Block Procedure  Approach:midline  Guidance:landmark technique and palpation technique  Location:L4-L5  Needle Type:Pencan  Needle Gauge:24 G  Placement of Spinal needle event:cerebrospinal fluid aspirated  Paresthesia: no  Fluid Appearance:clear  Medications: bupivacaine PF (MARCAINE) injection 0.75% - Epidural   1.8 mL - 10/21/2023 8:53:00 AM   Post Assessment  Patient Tolerance:patient tolerated the procedure well with no apparent complications  Complications no

## 2023-10-21 NOTE — L&D DELIVERY NOTE
[facility]   Section Operative Note    Pre-Operative Dx:   Failed induction   2.   Severe Preeclampsia  3.   37 weeks   4.   BMI 54.74           Postoperative dx:    1.  Failed induction   2.   Severe Preeclampsia  3.   37 weeks   4.   BMI 54.74     Procedure: Procedure(s):   SECTION PRIMARY   Surgeon: Mejia Harrell         Anesthesia: Spinal    EBL:   mls.  500  mls.                  I/O this shift:  In: 2632.4 [I.V.:2532.4; IV Piggyback:100]  Out: 200 [Urine:200]   Antibiotics: cefazolin (Ancef)                Gender: male  infant    Weight: 3215 g (7 lb 1.4 oz)     Apgars: 7  @ 1 minute /     9  @ 5 minutes                       Procedure Details:  The patient was taken to the operating room given regional anesthesia, prepped and draped in the usual sterile fashion. A  pfannenstiel incision was made with a scalpel and carried down sharply to the underlying layer of fascia with a scalpel. The fascia was incised in the midline and  the incision was extended laterally using curved Nj scissors.superior aspect of the incision was grasped with Kocher clamps and the underlying rectus muscles were dissected off with sharp and blunt dissection. Inferiorly, this was done in the exact same manner. The rectus muscles were  in the midline and the peritoneal incision was entered bluntly. The peritoneal incision was extended superiorly and inferiorly with good visualization of the bladder. We inserted an Aldo retractor into the incision. The vesico-uterine incision and  lower uterine segment was incised in a transverse fashion and the incision was extended laterally with blunt dissection. The baby was then delivered in an atraumatic fashion. The mouth and nose were suctioned and the infant was vigorous and gave a good cry. The baby was handed off to the waiting nursery staff. Next the placenta was delivered spontaneously.  The uterus was exteriorized and cleared of all clots and debris. The uterine  incision was closed with two layers of chromic 1 sutures. The second layer was an imbricating Lembert type stitch.The uterus was then returned back to the peritoneal cavity and the gutters were cleared of all clots and debris. We then closed the peritoneal incision using 3-0 Vicryl in a running fashion. The rectus fascia was then closed with a running stitch of 0 PDS suture. We irrigated the subcutaneous fatty tissue and closed it with a 3-0 plain gut The skin was closed with a 4-0 Vicryl in a subcuticular fashion and surgical adhesive was placed over the incision.  Sponge lap and needle counts were correct.          Complications: none      Grafts or Implants: NA     Disposition:   Mother to recovery room in stable condition          Mejia Harrell MD  10/21/2023  10:36 EDT

## 2023-10-22 LAB
BASOPHILS # BLD AUTO: 0.04 10*3/MM3 (ref 0–0.2)
BASOPHILS NFR BLD AUTO: 0.3 % (ref 0–1.5)
DEPRECATED RDW RBC AUTO: 46.5 FL (ref 37–54)
EOSINOPHIL # BLD AUTO: 0.08 10*3/MM3 (ref 0–0.4)
EOSINOPHIL NFR BLD AUTO: 0.7 % (ref 0.3–6.2)
ERYTHROCYTE [DISTWIDTH] IN BLOOD BY AUTOMATED COUNT: 14.3 % (ref 12.3–15.4)
HCT VFR BLD AUTO: 32.5 % (ref 34–46.6)
HGB BLD-MCNC: 10.4 G/DL (ref 12–15.9)
IMM GRANULOCYTES # BLD AUTO: 0.08 10*3/MM3 (ref 0–0.05)
IMM GRANULOCYTES NFR BLD AUTO: 0.7 % (ref 0–0.5)
LYMPHOCYTES # BLD AUTO: 2.53 10*3/MM3 (ref 0.7–3.1)
LYMPHOCYTES NFR BLD AUTO: 21.2 % (ref 19.6–45.3)
MCH RBC QN AUTO: 28.7 PG (ref 26.6–33)
MCHC RBC AUTO-ENTMCNC: 32 G/DL (ref 31.5–35.7)
MCV RBC AUTO: 89.5 FL (ref 79–97)
MONOCYTES # BLD AUTO: 0.92 10*3/MM3 (ref 0.1–0.9)
MONOCYTES NFR BLD AUTO: 7.7 % (ref 5–12)
NEUTROPHILS NFR BLD AUTO: 69.4 % (ref 42.7–76)
NEUTROPHILS NFR BLD AUTO: 8.28 10*3/MM3 (ref 1.7–7)
NRBC BLD AUTO-RTO: 0 /100 WBC (ref 0–0.2)
PLATELET # BLD AUTO: 234 10*3/MM3 (ref 140–450)
PMV BLD AUTO: 11.4 FL (ref 6–12)
RBC # BLD AUTO: 3.63 10*6/MM3 (ref 3.77–5.28)
WBC NRBC COR # BLD: 11.93 10*3/MM3 (ref 3.4–10.8)

## 2023-10-22 PROCEDURE — 25010000002 DIPHENHYDRAMINE PER 50 MG: Performed by: OBSTETRICS & GYNECOLOGY

## 2023-10-22 PROCEDURE — 85025 COMPLETE CBC W/AUTO DIFF WBC: CPT | Performed by: OBSTETRICS & GYNECOLOGY

## 2023-10-22 RX ORDER — LABETALOL 100 MG/1
100 TABLET, FILM COATED ORAL EVERY 8 HOURS SCHEDULED
Status: DISCONTINUED | OUTPATIENT
Start: 2023-10-22 | End: 2023-10-23 | Stop reason: HOSPADM

## 2023-10-22 RX ADMIN — SIMETHICONE 80 MG: 80 TABLET, CHEWABLE ORAL at 12:00

## 2023-10-22 RX ADMIN — PRENATAL VIT W/ FE FUMARATE-FA TAB 27-0.8 MG 1 TABLET: 27-0.8 TAB at 08:56

## 2023-10-22 RX ADMIN — DIPHENHYDRAMINE HYDROCHLORIDE 25 MG: 50 INJECTION, SOLUTION INTRAMUSCULAR; INTRAVENOUS at 00:20

## 2023-10-22 RX ADMIN — SIMETHICONE 80 MG: 80 TABLET, CHEWABLE ORAL at 17:46

## 2023-10-22 RX ADMIN — ACETAMINOPHEN 1000 MG: 500 TABLET ORAL at 06:01

## 2023-10-22 RX ADMIN — OXYCODONE HYDROCHLORIDE 5 MG: 5 TABLET ORAL at 12:01

## 2023-10-22 RX ADMIN — DOCUSATE SODIUM 100 MG: 100 CAPSULE, LIQUID FILLED ORAL at 08:56

## 2023-10-22 RX ADMIN — SIMETHICONE 80 MG: 80 TABLET, CHEWABLE ORAL at 06:01

## 2023-10-22 RX ADMIN — OXYCODONE HYDROCHLORIDE 5 MG: 5 TABLET ORAL at 06:05

## 2023-10-22 RX ADMIN — DOCUSATE SODIUM 100 MG: 100 CAPSULE, LIQUID FILLED ORAL at 20:30

## 2023-10-22 RX ADMIN — ACETAMINOPHEN 650 MG: 325 TABLET ORAL at 12:00

## 2023-10-22 RX ADMIN — OXYCODONE HYDROCHLORIDE 10 MG: 10 TABLET ORAL at 20:32

## 2023-10-22 RX ADMIN — LABETALOL HYDROCHLORIDE 100 MG: 100 TABLET, FILM COATED ORAL at 14:01

## 2023-10-22 RX ADMIN — LABETALOL HYDROCHLORIDE 100 MG: 100 TABLET, FILM COATED ORAL at 21:59

## 2023-10-22 RX ADMIN — ACETAMINOPHEN 650 MG: 325 TABLET ORAL at 17:46

## 2023-10-22 NOTE — PLAN OF CARE
Goal Outcome Evaluation:  Plan of Care Reviewed With: patient        Progress: improving  Outcome Evaluation: Patient ambulating independently. Incision clean and well approximated. Fundus firm and at midline. Light lochia rubra noted. Pt voiding spontaneously without difficulty. Pain managed with scheduled and PRN pain medication x1. Pt showered this shift. Pt denies vision changes, headache or dizziness. Will continue to follow POC.

## 2023-10-22 NOTE — PROGRESS NOTES
Subjective     Postpartum Day 1:  Delivery    The patient feels well.  Pain is controlled.  Lochia is light.     Objective     Vital signs in last 24 hours:  Temp:  [97.9 °F (36.6 °C)-99.2 °F (37.3 °C)] 98.4 °F (36.9 °C)  Heart Rate:  [] 105  Resp:  [16-18] 18  BP: (120-164)/(62-89) 164/87      General:    alert, appears stated age, and cooperative   Bowel Sounds:  active   Lochia:  appropriate   Uterine Fundus:   firm   Incision:  healing well, no significant drainage, no dehiscence, no significant erythema   DVT Evaluation:  No evidence of DVT seen on physical exam.     Assessment/Plan     Status post  section: Postoperative course complicated by preeclampsia.   Her baby was transferred to  this morning for oxygen desaturation.     Continue current care.  Add antihypertensive medication.

## 2023-10-22 NOTE — PLAN OF CARE
Goal Outcome Evaluation:           Progress: improving  Outcome Evaluation: Pt. progressing well towards goals. Ambulating independently and voiding spontaneously w/o difficulty. Fundus firm at midline with light lochia noted.  incision cdi and well approximated. Pain managed with scheduled medications, VSS.

## 2023-10-23 ENCOUNTER — PATIENT OUTREACH (OUTPATIENT)
Dept: LABOR AND DELIVERY | Facility: HOSPITAL | Age: 23
End: 2023-10-23
Payer: COMMERCIAL

## 2023-10-23 VITALS
DIASTOLIC BLOOD PRESSURE: 77 MMHG | WEIGHT: 293 LBS | RESPIRATION RATE: 18 BRPM | TEMPERATURE: 98.2 F | SYSTOLIC BLOOD PRESSURE: 150 MMHG | HEIGHT: 68 IN | HEART RATE: 103 BPM | OXYGEN SATURATION: 97 % | BODY MASS INDEX: 44.41 KG/M2

## 2023-10-23 LAB
BASOPHILS # BLD AUTO: 0.04 10*3/MM3 (ref 0–0.2)
BASOPHILS NFR BLD AUTO: 0.4 % (ref 0–1.5)
DEPRECATED RDW RBC AUTO: 47.2 FL (ref 37–54)
EOSINOPHIL # BLD AUTO: 0.17 10*3/MM3 (ref 0–0.4)
EOSINOPHIL NFR BLD AUTO: 1.7 % (ref 0.3–6.2)
ERYTHROCYTE [DISTWIDTH] IN BLOOD BY AUTOMATED COUNT: 14.6 % (ref 12.3–15.4)
HCT VFR BLD AUTO: 31.4 % (ref 34–46.6)
HGB BLD-MCNC: 10.1 G/DL (ref 12–15.9)
IMM GRANULOCYTES # BLD AUTO: 0.08 10*3/MM3 (ref 0–0.05)
IMM GRANULOCYTES NFR BLD AUTO: 0.8 % (ref 0–0.5)
LYMPHOCYTES # BLD AUTO: 1.91 10*3/MM3 (ref 0.7–3.1)
LYMPHOCYTES NFR BLD AUTO: 19 % (ref 19.6–45.3)
MCH RBC QN AUTO: 28.7 PG (ref 26.6–33)
MCHC RBC AUTO-ENTMCNC: 32.2 G/DL (ref 31.5–35.7)
MCV RBC AUTO: 89.2 FL (ref 79–97)
MONOCYTES # BLD AUTO: 0.78 10*3/MM3 (ref 0.1–0.9)
MONOCYTES NFR BLD AUTO: 7.7 % (ref 5–12)
NEUTROPHILS NFR BLD AUTO: 7.09 10*3/MM3 (ref 1.7–7)
NEUTROPHILS NFR BLD AUTO: 70.4 % (ref 42.7–76)
NRBC BLD AUTO-RTO: 0 /100 WBC (ref 0–0.2)
PLATELET # BLD AUTO: 206 10*3/MM3 (ref 140–450)
PMV BLD AUTO: 11.5 FL (ref 6–12)
RBC # BLD AUTO: 3.52 10*6/MM3 (ref 3.77–5.28)
WBC NRBC COR # BLD: 10.07 10*3/MM3 (ref 3.4–10.8)

## 2023-10-23 PROCEDURE — 85025 COMPLETE CBC W/AUTO DIFF WBC: CPT | Performed by: OBSTETRICS & GYNECOLOGY

## 2023-10-23 RX ORDER — IBUPROFEN 600 MG/1
600 TABLET ORAL EVERY 6 HOURS PRN
Qty: 40 TABLET | Refills: 1 | Status: SHIPPED | OUTPATIENT
Start: 2023-10-23

## 2023-10-23 RX ORDER — SIMETHICONE 80 MG
80 TABLET,CHEWABLE ORAL 4 TIMES DAILY
Qty: 40 TABLET | Refills: 1 | Status: SHIPPED | OUTPATIENT
Start: 2023-10-23

## 2023-10-23 RX ORDER — PSEUDOEPHEDRINE HCL 30 MG
100 TABLET ORAL 2 TIMES DAILY PRN
Qty: 40 CAPSULE | Refills: 1 | Status: SHIPPED | OUTPATIENT
Start: 2023-10-23

## 2023-10-23 RX ORDER — LABETALOL 100 MG/1
100 TABLET, FILM COATED ORAL EVERY 8 HOURS SCHEDULED
Qty: 60 TABLET | Refills: 1 | Status: SHIPPED | OUTPATIENT
Start: 2023-10-23

## 2023-10-23 RX ORDER — OXYCODONE HYDROCHLORIDE 5 MG/1
5 TABLET ORAL EVERY 4 HOURS PRN
Qty: 10 TABLET | Refills: 0 | Status: SHIPPED | OUTPATIENT
Start: 2023-10-23 | End: 2023-10-26

## 2023-10-23 RX ADMIN — SIMETHICONE 80 MG: 80 TABLET, CHEWABLE ORAL at 00:07

## 2023-10-23 RX ADMIN — SIMETHICONE 80 MG: 80 TABLET, CHEWABLE ORAL at 05:54

## 2023-10-23 RX ADMIN — PRENATAL VIT W/ FE FUMARATE-FA TAB 27-0.8 MG 1 TABLET: 27-0.8 TAB at 09:21

## 2023-10-23 RX ADMIN — OXYCODONE HYDROCHLORIDE 10 MG: 10 TABLET ORAL at 03:29

## 2023-10-23 RX ADMIN — ACETAMINOPHEN 650 MG: 325 TABLET ORAL at 00:07

## 2023-10-23 RX ADMIN — DOCUSATE SODIUM 100 MG: 100 CAPSULE, LIQUID FILLED ORAL at 09:20

## 2023-10-23 RX ADMIN — ACETAMINOPHEN 650 MG: 325 TABLET ORAL at 05:54

## 2023-10-23 RX ADMIN — LABETALOL HYDROCHLORIDE 100 MG: 100 TABLET, FILM COATED ORAL at 05:54

## 2023-10-23 NOTE — DISCHARGE SUMMARY
" Chu  Delivery Discharge Summary    Primary OB Clinician:     EDC: Estimated Date of Delivery: 11/10/23    Gestational Age:37w1d    Antepartum complications: pregnancy-induced hypertension    Date of Delivery: 10/21/2023   Time of Delivery: 9:12 AM     Delivered By:  Mejia Harrell     Delivery Type: , Low Transverse      Tubal Ligation: n/a    Baby:   male    Apgar:  7  @ 1 minute /   Apgar:  9  @ 5 minutes   Weight: 3215 g (7 lb 1.4 oz)     Anesthesia: Spinal      Intrapartum complications: PIH    Rh Immune globulin given: not applicable    Subjective     Subjective   Post-Op Day 2 S/P   Subjective  Patient reports:  Pain is controlled with ibuprofen (OTC) and narcotic analgesics including Percocet.  She is up out of bed this am. Tolerating diet. Tolerating po -- normal. Voiding - without difficulty; flatus reported..  Vaginal bleeding is as much as expected.        Breastfeeding: with difficulty.    Objective    Objective:  Vital signs (most recent): Blood pressure 129/71, pulse 105, temperature 99.5 °F (37.5 °C), temperature source Oral, resp. rate 18, height 172.7 cm (68\"), weight (!) 163 kg (360 lb), last menstrual period 2023, SpO2 97%, currently breastfeeding.    Vitals: Vital Signs Range for the last 24 hours  Temperature: Temp:  [97.7 °F (36.5 °C)-99.5 °F (37.5 °C)] 99.5 °F (37.5 °C)   Temp Source: Temp src: Oral   BP: BP: (129-164)/(69-87) 129/71   Pulse: Heart Rate:  [102-114] 105   Respirations: Resp:  [16-18] 18   Weight:          Physical Exam    Lungs clear to auscultation bilaterally   Abdomen Soft, ND, tender along incision. + BS   Incision  well approximated   Extremities extremities normal, atraumatic, no cyanosis + edema equal bilaterally no erythema or warmth.       [unfilled]       Lab Results   Component Value Date    ABO B 10/19/2023    RH Positive 10/19/2023        Lab Results   Component Value Date    HGB 10.1 (L) 10/23/2023    HCT 31.4 (L) 10/23/2023       Assesment " and Plan:       Antepartum mild preeclampsia    Pregnancy    37 weeks gestation of pregnancy    Failed medical induction of labor    Postpartum care following  delivery    Assessment & Plan    Assessment:    Angie Ding is Day 2  post-partum  , Low Transverse   .      Plan:    Continue post op care and plan for discharge today.   Baby was transferred to .  Outpatient Lactation Referral ordered if needed by patient.     Continue Labetalol 100mg Q8h.  Advised patient to check blood pressure daily at home and keep a log.    Notify the clinic with readings >150/100, severe headache, or visual changes.     Follow-up appointment with [facility] in 1 week for blood pressure check.    Discharge Date: 10/23/2023; Discharge Time: 09:07 EDT        DAVID Rebollar  10/23/2023  09:06 EDT

## 2023-10-23 NOTE — OUTREACH NOTE
Motherhood Connection  IP Postpartum    Questions/Answers      Flowsheet Row Responses   Best Method for Contacting Cell   Support Person Present Yes   Does the patient have a car seat at the hospital No   Car Seat Comment Pts infant was transferred to [facility].   Delivery Note Reviewed Reviewed   Were birth expectations met? Yes   Is there a need for additional support/resources? No   Lactation Note Reviewed Other   Note Reviewed Other Comment NA   Is additional support needed? No   Any questions or concerns? No   Is the patient going to use Meds to Beds? Yes   Any concerns related discharge meds/ability to  prescriptions? No   OB Discharge Navigator Reviewed  Reviewed   Confirm Postpartum OB appointment No  [Will be made prior to discharge home.]   Confirm initial well-child Pediatrician appointment date/time: No  [Infant transferred to .]   Additional post-discharge F/U appointments No   Does patient have transportation to appointments? Yes   Any other assistance needed to ensure she is able to attend appointments? No   Does patient have supplies needed at home for  care? Clothing, Crib, Diapers, Breast Pump            Spoke with patient at bedside. Pt states she is doing fairly well following C/section. Pt denies any urgent needs or concerns.    Referral submitted to the following resources (verbal consent received to submit demographic information):         Marissa Reynaga RN  Maternity Nurse Navigator    10/23/2023, 11:47 EDT

## 2023-10-23 NOTE — PLAN OF CARE
Goal Outcome Evaluation:  Plan of Care Reviewed With: patient        Progress: improving  Outcome Evaluation: Incision clean dry and well approximated. Light lochia rubra. Fundus firm and at midline. Voiding without difficulty. Ambulating independently. PRN meds administered for pain.

## 2023-10-25 LAB — REF LAB TEST METHOD: NORMAL

## 2023-10-30 ENCOUNTER — PATIENT OUTREACH (OUTPATIENT)
Dept: LABOR AND DELIVERY | Facility: HOSPITAL | Age: 23
End: 2023-10-30
Payer: COMMERCIAL

## 2023-10-30 NOTE — OUTREACH NOTE
Motherhood Connection  Unable to Reach       Questions/Answers      Flowsheet Row Responses   Pending Outreach Postpartum Check-in   Call Attempt First   Outcome Left message   Next Call Attempt Date 10/31/23   Unable to reach comments: Left message at mobile number.            Left message with call back number.    Marissa Reynaga RN  Maternity Nurse Navigator    10/30/2023, 16:35 EDT

## 2023-10-31 ENCOUNTER — PATIENT OUTREACH (OUTPATIENT)
Dept: LABOR AND DELIVERY | Facility: HOSPITAL | Age: 23
End: 2023-10-31
Payer: COMMERCIAL

## 2023-10-31 NOTE — OUTREACH NOTE
Motherhood Connection  Postpartum Check-In    Questions/Answers      Flowsheet Row Responses   Visit Setting Telephone   Best Method for Contacting Cell   OB Discharge Note Reviewed  Reviewed   OB Discharge Navigator Reviewed  Reviewed   OB Discharge Medications Reviewed  Reviewed    discharged home with mother? Infant in ICU   Comment Infant was transferred to [facility]   Current Pain Levels 0-10 0   At Rest Pain Levels 0-10 0   Pain level with activity 0-10 1   Acceptable Pain Level 0-10 2   Pain Location Abdomen   Pain Description Incisional, Sore, Tender   How do you treat your pain? Position Changes   Verbalized Emotional State Acceptance   Family/Support Network Family, Mother, Significant Other   Level of Involvement in Care Attentive, Supportive   Do you feel comfortable in your relationship with your baby? Other   Other Comment Patient is at her infant's bedside at [facility]   Have members of your household adjusted to your baby? N/A   Is the baby's father supportive and/or involved with the baby? Yes   How does your partner feel about the baby? Happy, Involved   Do you feel safe at home, school and work? Yes   Are you in a relationship with someone who threatens you or hurts you? No   Do you have the resources to keep yourself and your baby healthy and safe? Yes   Lochia (per patient report) Similar to Menstrual Flow   Amount Spotting   Number of pads per day 4   Lochia Odor Similar to menstrual flow   Is patient breastfeeding? Yes, pumping   Breast Care Supportive Bra, Breast Pads   pumping - Left Breast Soft, Full   Pumping - Right Breast Soft, Full   Pumping - Left Nipple Intact   Pumping - Right Nipple Intact   Frequency every 2 - 3 hours   Pumping Quantity 120 - 150 ml   Storage of Milk refrigerator   Postpartum Depression Screening Education Education Provided   Peripheral Blood Glucose Other   Doctor Appointments: Education Provided   Breastfeeding Education Education Provided   Family  Planning Education Declined   Postpartum Care Education Education Provided   S & S to report Education Provided   Followup Appointments Made Yes   Well Child Visit Appointments Made N/A  [Infant remains at [facility]]   Appointment Date 10/30/23   Provider/Agency DAVID Lees [facility]   Peripheral Blood Glucose Education Referred NA            Review of Systems   Constitutional: Negative.    HENT: Negative.     Eyes: Negative.    Respiratory: Negative.     Cardiovascular: Negative.    Gastrointestinal:  Positive for abdominal pain.   Endocrine: Negative.    Genitourinary: Negative.    Musculoskeletal:  Positive for back pain.   Skin:  Positive for wound.   Allergic/Immunologic: Negative.    Neurological: Negative.    Hematological: Negative.    Psychiatric/Behavioral: Negative.       Most Recent [place]  Depression Scale Score (EPDS)    Performed by a clinician: 1 (10/31/2023  3:51 PM)    Received via Oration questionnaire:  ()     Spoke with patient over the phone. Pt with history of PIH, had C/section on 10/21/2023. Her infant son was transferred to [facility] and is still a patient as of today. Pt reports she is doing well. She states her abdominal incision appears to be healing well and that she has minimal PP pain and bleeding. She states she is pumping breast and storing milk for feeding her infant at this time. She states her infant is not intubated anymore and is taking PO feeds well. Pt denies any needs or concerns at this time.  Referral submitted to the following resources (verbal consent received to submit demographic information        Marissa Reynaga RN  Maternity Nurse Navigator    10/31/2023, 16:03 EDT

## 2023-10-31 NOTE — OUTREACH NOTE
[facility]hood [facility]  Unable to Reach       Questions/Answers      Flowsheet Row Responses   Pending Outreach Postpartum Check-in   Call Attempt Second   Outcome Left message   Unable to reach comments: Spoke wtih patients mother at home phone number and left message.            Left message.    Marissa Reynaga RN  Maternity Nurse Navigator    10/31/2023, 15:46 EDT

## 2023-11-07 ENCOUNTER — PATIENT OUTREACH (OUTPATIENT)
Dept: CALL CENTER | Facility: HOSPITAL | Age: 23
End: 2023-11-07
Payer: COMMERCIAL

## 2023-11-07 NOTE — OUTREACH NOTE
Motherhood Connection Survey      Flowsheet Row Responses   [facility] facility patient discharged from? [facility]   Week 1 attempt successful? Yes   Call start time 153   Call end time    Baby sex Boy   [place] discharged home with mother? Yes  [came home yesterday]   Baby sex Boy   Delivery type    Emotional state Acceptance   Family support Yes   Do you have all necessary resources to care for you and your baby?  Yes   Have members of your household adjusted to your baby? Yes   Did you have any problems with pre-eclampsia during this pregnancy? Resolved   Do you have any of the following: No Issues   Did you have blood glucose issues during this pregnancy No   Lochia amount Light   Lochia per patient report Rubra   Did you have an episiotomy/tear/abdominal incision? Yes   Feeding Method Combination   Frequency every 2 - 3 hours   Pumping Yes   Storage of Milk refrigerator   Supplementing Breast Milk   Frequency 2-3   Breast Condition No   Nipple Condition No   Nursing Interventions Supportive bra   Signs baby is ready to eat Rooting, Crying   Number of wet diapers x 24 hours 12   Last BM x 24 hours 6   Umbilical Cord No reported signs or symptoms   Umbilical cord comments sill attached   Mom appointment comments: had her appt yesterday   Baby appointment comments: baby just released from [facility] yesterday   Call completed? Yes   How satisfied were you with the Motherhood Connection Program? 5              MINNIE CORCORAN - Registered Nurse

## 2024-05-24 LAB
EXTERNAL ANTIBODY SCREEN: NEGATIVE
EXTERNAL CHLAMYDIA SCREEN: NEGATIVE
EXTERNAL GONORRHEA SCREEN: NEGATIVE
EXTERNAL HEPATITIS B SURFACE ANTIGEN: NEGATIVE
EXTERNAL RUBELLA QUALITATIVE: NORMAL
EXTERNAL SYPHILIS RPR SCREEN: NORMAL
HIV1 P24 AG SERPL QL IA: NORMAL

## 2024-07-15 ENCOUNTER — HOSPITAL ENCOUNTER (OUTPATIENT)
Facility: HOSPITAL | Age: 24
Discharge: HOME | End: 2024-07-15
Attending: OBSTETRICS & GYNECOLOGY | Admitting: OBSTETRICS & GYNECOLOGY
Payer: COMMERCIAL

## 2024-07-15 VITALS
HEIGHT: 68 IN | BODY MASS INDEX: 44.41 KG/M2 | RESPIRATION RATE: 18 BRPM | SYSTOLIC BLOOD PRESSURE: 156 MMHG | HEART RATE: 97 BPM | DIASTOLIC BLOOD PRESSURE: 73 MMHG | WEIGHT: 293 LBS | TEMPERATURE: 98.1 F

## 2024-07-15 PROBLEM — I10 HTN (HYPERTENSION): Status: ACTIVE | Noted: 2024-07-15

## 2024-07-15 LAB
BACTERIA UR QL AUTO: ABNORMAL /HPF
BILIRUB UR QL STRIP: NEGATIVE
CLARITY UR: ABNORMAL
COLOR UR: ABNORMAL
GLUCOSE UR STRIP-MCNC: NEGATIVE MG/DL
HGB UR QL STRIP.AUTO: NEGATIVE
HYALINE CASTS UR QL AUTO: ABNORMAL /LPF
KETONES UR QL STRIP: ABNORMAL
LEUKOCYTE ESTERASE UR QL STRIP.AUTO: ABNORMAL
NITRITE UR QL STRIP: NEGATIVE
PH UR STRIP.AUTO: 6 [PH] (ref 5–8)
PROT UR QL STRIP: ABNORMAL
RBC # UR STRIP: ABNORMAL /HPF
REF LAB TEST METHOD: ABNORMAL
SP GR UR STRIP: >1.03 (ref 1–1.03)
SQUAMOUS #/AREA URNS HPF: ABNORMAL /HPF
UROBILINOGEN UR QL STRIP: ABNORMAL
WBC # UR STRIP: ABNORMAL /HPF

## 2024-07-15 PROCEDURE — G0463 HOSPITAL OUTPT CLINIC VISIT: HCPCS

## 2024-07-15 PROCEDURE — 81001 URINALYSIS AUTO W/SCOPE: CPT | Performed by: OBSTETRICS & GYNECOLOGY

## 2024-07-15 RX ORDER — NIFEDIPINE 60 MG/1
60 TABLET, EXTENDED RELEASE ORAL 2 TIMES DAILY
COMMUNITY

## 2024-07-15 NOTE — DISCHARGE INSTRUCTIONS
TAKE ALL MEDICATIONS AS PRESCRIBED, DRINK MORE WATER AS TOLERATED AND KEEP SCHEDULED APPT WITH [facility].

## 2024-10-16 ENCOUNTER — APPOINTMENT (OUTPATIENT)
Dept: ULTRASOUND IMAGING | Facility: HOSPITAL | Age: 24
End: 2024-10-16
Payer: COMMERCIAL

## 2024-10-16 ENCOUNTER — HOSPITAL ENCOUNTER (OUTPATIENT)
Facility: HOSPITAL | Age: 24
Discharge: HOME | End: 2024-10-17
Attending: OBSTETRICS & GYNECOLOGY | Admitting: OBSTETRICS & GYNECOLOGY
Payer: COMMERCIAL

## 2024-10-16 VITALS
SYSTOLIC BLOOD PRESSURE: 148 MMHG | DIASTOLIC BLOOD PRESSURE: 69 MMHG | RESPIRATION RATE: 20 BRPM | TEMPERATURE: 98.2 F | HEART RATE: 94 BPM | HEIGHT: 68 IN | BODY MASS INDEX: 44.41 KG/M2 | WEIGHT: 293 LBS

## 2024-10-16 PROBLEM — O36.8190 DECREASED FETAL MOVEMENT: Status: ACTIVE | Noted: 2024-10-16

## 2024-10-16 LAB
ABO GROUP BLD: NORMAL
ALBUMIN SERPL-MCNC: 3.5 G/DL (ref 3.5–5.2)
ALBUMIN/GLOB SERPL: 1 G/DL
ALP SERPL-CCNC: 105 U/L (ref 39–117)
ALT SERPL W P-5'-P-CCNC: 11 U/L (ref 1–33)
AMPHET+METHAMPHET UR QL: NEGATIVE
AMPHETAMINES UR QL: NEGATIVE
ANION GAP SERPL CALCULATED.3IONS-SCNC: 13.2 MMOL/L (ref 5–15)
AST SERPL-CCNC: 16 U/L (ref 1–32)
BACTERIA UR QL AUTO: ABNORMAL /HPF
BARBITURATES UR QL SCN: NEGATIVE
BENZODIAZ UR QL SCN: NEGATIVE
BILIRUB SERPL-MCNC: 0.2 MG/DL (ref 0–1.2)
BILIRUB UR QL STRIP: NEGATIVE
BLD GP AB SCN SERPL QL: NEGATIVE
BUN SERPL-MCNC: 9 MG/DL (ref 6–20)
BUN/CREAT SERPL: 15.8 (ref 7–25)
BUPRENORPHINE SERPL-MCNC: NEGATIVE NG/ML
CALCIUM SPEC-SCNC: 9.9 MG/DL (ref 8.6–10.5)
CANNABINOIDS SERPL QL: NEGATIVE
CHLORIDE SERPL-SCNC: 105 MMOL/L (ref 98–107)
CLARITY UR: ABNORMAL
CO2 SERPL-SCNC: 19.8 MMOL/L (ref 22–29)
COCAINE UR QL: NEGATIVE
COLOR UR: YELLOW
CREAT SERPL-MCNC: 0.57 MG/DL (ref 0.57–1)
DEPRECATED RDW RBC AUTO: 46.8 FL (ref 37–54)
EGFRCR SERPLBLD CKD-EPI 2021: 129.5 ML/MIN/1.73
ERYTHROCYTE [DISTWIDTH] IN BLOOD BY AUTOMATED COUNT: 14.6 % (ref 12.3–15.4)
FENTANYL UR-MCNC: NEGATIVE NG/ML
FIBRINOGEN PPP-MCNC: 517 MG/DL (ref 173–524)
GLOBULIN UR ELPH-MCNC: 3.4 GM/DL
GLUCOSE SERPL-MCNC: 83 MG/DL (ref 65–99)
GLUCOSE UR STRIP-MCNC: NEGATIVE MG/DL
HCT VFR BLD AUTO: 38.1 % (ref 34–46.6)
HGB BLD-MCNC: 12.1 G/DL (ref 12–15.9)
HGB UR QL STRIP.AUTO: ABNORMAL
HYALINE CASTS UR QL AUTO: ABNORMAL /LPF
KETONES UR QL STRIP: NEGATIVE
LDH SERPL-CCNC: 175 U/L (ref 135–214)
LEUKOCYTE ESTERASE UR QL STRIP.AUTO: ABNORMAL
MCH RBC QN AUTO: 27.7 PG (ref 26.6–33)
MCHC RBC AUTO-ENTMCNC: 31.8 G/DL (ref 31.5–35.7)
MCV RBC AUTO: 87.2 FL (ref 79–97)
METHADONE UR QL SCN: NEGATIVE
NITRITE UR QL STRIP: NEGATIVE
OPIATES UR QL: NEGATIVE
OXYCODONE UR QL SCN: NEGATIVE
PCP UR QL SCN: NEGATIVE
PH UR STRIP.AUTO: 6 [PH] (ref 5–8)
PLATELET # BLD AUTO: 216 10*3/MM3 (ref 140–450)
PMV BLD AUTO: 11.5 FL (ref 6–12)
POTASSIUM SERPL-SCNC: 3.9 MMOL/L (ref 3.5–5.2)
PROT SERPL-MCNC: 6.9 G/DL (ref 6–8.5)
PROT UR QL STRIP: ABNORMAL
RBC # BLD AUTO: 4.37 10*6/MM3 (ref 3.77–5.28)
RBC # UR STRIP: ABNORMAL /HPF
REF LAB TEST METHOD: ABNORMAL
RH BLD: POSITIVE
SODIUM SERPL-SCNC: 138 MMOL/L (ref 136–145)
SP GR UR STRIP: 1.02 (ref 1–1.03)
SQUAMOUS #/AREA URNS HPF: ABNORMAL /HPF
T&S EXPIRATION DATE: NORMAL
TRICYCLICS UR QL SCN: NEGATIVE
URATE SERPL-MCNC: 5 MG/DL (ref 2.4–5.7)
UROBILINOGEN UR QL STRIP: ABNORMAL
WBC # UR STRIP: ABNORMAL /HPF
WBC NRBC COR # BLD AUTO: 8.15 10*3/MM3 (ref 3.4–10.8)

## 2024-10-16 PROCEDURE — 76819 FETAL BIOPHYS PROFIL W/O NST: CPT

## 2024-10-16 PROCEDURE — 86900 BLOOD TYPING SEROLOGIC ABO: CPT | Performed by: OBSTETRICS & GYNECOLOGY

## 2024-10-16 PROCEDURE — 76819 FETAL BIOPHYS PROFIL W/O NST: CPT | Performed by: RADIOLOGY

## 2024-10-16 PROCEDURE — 82570 ASSAY OF URINE CREATININE: CPT | Performed by: OBSTETRICS & GYNECOLOGY

## 2024-10-16 PROCEDURE — 81001 URINALYSIS AUTO W/SCOPE: CPT | Performed by: OBSTETRICS & GYNECOLOGY

## 2024-10-16 PROCEDURE — 59025 FETAL NON-STRESS TEST: CPT

## 2024-10-16 PROCEDURE — 87086 URINE CULTURE/COLONY COUNT: CPT | Performed by: OBSTETRICS & GYNECOLOGY

## 2024-10-16 PROCEDURE — G0463 HOSPITAL OUTPT CLINIC VISIT: HCPCS

## 2024-10-16 PROCEDURE — 80053 COMPREHEN METABOLIC PANEL: CPT | Performed by: OBSTETRICS & GYNECOLOGY

## 2024-10-16 PROCEDURE — 36415 COLL VENOUS BLD VENIPUNCTURE: CPT | Performed by: OBSTETRICS & GYNECOLOGY

## 2024-10-16 PROCEDURE — 85384 FIBRINOGEN ACTIVITY: CPT | Performed by: OBSTETRICS & GYNECOLOGY

## 2024-10-16 PROCEDURE — 83615 LACTATE (LD) (LDH) ENZYME: CPT | Performed by: OBSTETRICS & GYNECOLOGY

## 2024-10-16 PROCEDURE — 85027 COMPLETE CBC AUTOMATED: CPT | Performed by: OBSTETRICS & GYNECOLOGY

## 2024-10-16 PROCEDURE — 86850 RBC ANTIBODY SCREEN: CPT | Performed by: OBSTETRICS & GYNECOLOGY

## 2024-10-16 PROCEDURE — 84550 ASSAY OF BLOOD/URIC ACID: CPT | Performed by: OBSTETRICS & GYNECOLOGY

## 2024-10-16 PROCEDURE — 80307 DRUG TEST PRSMV CHEM ANLYZR: CPT | Performed by: OBSTETRICS & GYNECOLOGY

## 2024-10-16 PROCEDURE — 84156 ASSAY OF PROTEIN URINE: CPT | Performed by: OBSTETRICS & GYNECOLOGY

## 2024-10-16 PROCEDURE — 86901 BLOOD TYPING SEROLOGIC RH(D): CPT | Performed by: OBSTETRICS & GYNECOLOGY

## 2024-10-16 RX ORDER — HYDRALAZINE HYDROCHLORIDE 25 MG/1
25 TABLET, FILM COATED ORAL 2 TIMES DAILY
COMMUNITY

## 2024-10-16 RX ORDER — SODIUM CHLORIDE 0.9 % (FLUSH) 0.9 %
10 SYRINGE (ML) INJECTION AS NEEDED
Status: DISCONTINUED | OUTPATIENT
Start: 2024-10-16 | End: 2024-10-17 | Stop reason: HOSPADM

## 2024-10-16 RX ORDER — SODIUM CHLORIDE 0.9 % (FLUSH) 0.9 %
10 SYRINGE (ML) INJECTION EVERY 12 HOURS SCHEDULED
Status: DISCONTINUED | OUTPATIENT
Start: 2024-10-16 | End: 2024-10-17 | Stop reason: HOSPADM

## 2024-10-17 LAB
CREAT UR-MCNC: 125.8 MG/DL
PROT ?TM UR-MCNC: 14.2 MG/DL
PROT/CREAT UR: 112.9 MG/G CREA (ref 0–200)

## 2024-10-17 NOTE — NON STRESS TEST
Angie Ding, a  at 35w1d with an GLORIA of 2024, by Ultrasound, was seen at [facility] LABOR DELIVERY for a nonstress test.    Chief Complaint   Patient presents with    Decreased Fetal Movement     PT PRESENTS TO LABOR AND DELIVERY C/O VAGINAL BLEEDING WITH CLOTS AND CRAMPING. STATES SHE HAS NOT FELT BABY MOVE TODAY. DENIES LOF.        Patient Active Problem List   Diagnosis    Pregnancy    Antepartum mild preeclampsia    37 weeks gestation of pregnancy    Failed medical induction of labor    Postpartum care following  delivery    HTN (hypertension)    Decreased fetal movement       Start Time:   Stop Time:     Interpretation A  Nonstress Test Interpretation A: Reactive  Comments A: VERIFIED BY REYNOLD HARRIS RN

## 2024-10-17 NOTE — DISCHARGE INSTRUCTIONS
KEEP SCHEDULED APPT IN OFFICE ON FRIDAY FOR [facility]. RETURN TO LABOR AND DELIVERY IF YOU HAVE CONTRACTIONS, LEAKING OF FLUID OR VAGINAL BLEEDING. MONITOR FETAL KICK COUNTS AND RETURN IF YOU NOTICE A DECREASE IN FETAL MOVEMENT.

## 2024-10-18 LAB — BACTERIA SPEC AEROBE CULT: NORMAL

## 2024-10-21 ENCOUNTER — HOSPITAL ENCOUNTER (OUTPATIENT)
Facility: HOSPITAL | Age: 24
Discharge: HOME | End: 2024-10-22
Attending: OBSTETRICS & GYNECOLOGY | Admitting: OBSTETRICS & GYNECOLOGY
Payer: COMMERCIAL

## 2024-10-22 VITALS
SYSTOLIC BLOOD PRESSURE: 129 MMHG | DIASTOLIC BLOOD PRESSURE: 58 MMHG | RESPIRATION RATE: 20 BRPM | BODY MASS INDEX: 44.41 KG/M2 | WEIGHT: 293 LBS | TEMPERATURE: 97.5 F | HEART RATE: 97 BPM | HEIGHT: 68 IN

## 2024-10-22 VITALS
SYSTOLIC BLOOD PRESSURE: 133 MMHG | HEIGHT: 68 IN | RESPIRATION RATE: 20 BRPM | WEIGHT: 293 LBS | HEART RATE: 96 BPM | DIASTOLIC BLOOD PRESSURE: 72 MMHG | BODY MASS INDEX: 44.41 KG/M2 | TEMPERATURE: 97.6 F

## 2024-10-22 PROBLEM — R51.9 HEADACHE: Status: ACTIVE | Noted: 2024-10-22

## 2024-10-22 PROBLEM — O10.919 CHRONIC HYPERTENSION AFFECTING PREGNANCY: Status: ACTIVE | Noted: 2024-10-22

## 2024-10-22 LAB
ALBUMIN SERPL-MCNC: 3.5 G/DL (ref 3.5–5.2)
ALBUMIN SERPL-MCNC: 3.5 G/DL (ref 3.5–5.2)
ALBUMIN/GLOB SERPL: 1 G/DL
ALBUMIN/GLOB SERPL: 1.1 G/DL
ALP SERPL-CCNC: 104 U/L (ref 39–117)
ALP SERPL-CCNC: 113 U/L (ref 39–117)
ALT SERPL W P-5'-P-CCNC: 11 U/L (ref 1–33)
ALT SERPL W P-5'-P-CCNC: 11 U/L (ref 1–33)
ANION GAP SERPL CALCULATED.3IONS-SCNC: 13.1 MMOL/L (ref 5–15)
ANION GAP SERPL CALCULATED.3IONS-SCNC: 15 MMOL/L (ref 5–15)
AST SERPL-CCNC: 14 U/L (ref 1–32)
AST SERPL-CCNC: 17 U/L (ref 1–32)
BACTERIA UR QL AUTO: ABNORMAL /HPF
BASOPHILS # BLD AUTO: 0.04 10*3/MM3 (ref 0–0.2)
BASOPHILS NFR BLD AUTO: 0.4 % (ref 0–1.5)
BILIRUB SERPL-MCNC: 0.2 MG/DL (ref 0–1.2)
BILIRUB SERPL-MCNC: <0.2 MG/DL (ref 0–1.2)
BILIRUB UR QL STRIP: ABNORMAL
BUN SERPL-MCNC: 10 MG/DL (ref 6–20)
BUN SERPL-MCNC: 11 MG/DL (ref 6–20)
BUN/CREAT SERPL: 14.7 (ref 7–25)
BUN/CREAT SERPL: 18 (ref 7–25)
CALCIUM SPEC-SCNC: 9.1 MG/DL (ref 8.6–10.5)
CALCIUM SPEC-SCNC: 9.5 MG/DL (ref 8.6–10.5)
CHLORIDE SERPL-SCNC: 103 MMOL/L (ref 98–107)
CHLORIDE SERPL-SCNC: 104 MMOL/L (ref 98–107)
CLARITY UR: ABNORMAL
CO2 SERPL-SCNC: 19.9 MMOL/L (ref 22–29)
CO2 SERPL-SCNC: 21 MMOL/L (ref 22–29)
COLOR UR: ABNORMAL
CREAT SERPL-MCNC: 0.61 MG/DL (ref 0.57–1)
CREAT SERPL-MCNC: 0.68 MG/DL (ref 0.57–1)
DEPRECATED RDW RBC AUTO: 46.4 FL (ref 37–54)
DEPRECATED RDW RBC AUTO: 46.5 FL (ref 37–54)
EGFRCR SERPLBLD CKD-EPI 2021: 123.4 ML/MIN/1.73
EGFRCR SERPLBLD CKD-EPI 2021: 126.6 ML/MIN/1.73
EOSINOPHIL # BLD AUTO: 0.1 10*3/MM3 (ref 0–0.4)
EOSINOPHIL NFR BLD AUTO: 1 % (ref 0.3–6.2)
ERYTHROCYTE [DISTWIDTH] IN BLOOD BY AUTOMATED COUNT: 14.6 % (ref 12.3–15.4)
ERYTHROCYTE [DISTWIDTH] IN BLOOD BY AUTOMATED COUNT: 14.6 % (ref 12.3–15.4)
GLOBULIN UR ELPH-MCNC: 3.2 GM/DL
GLOBULIN UR ELPH-MCNC: 3.5 GM/DL
GLUCOSE SERPL-MCNC: 110 MG/DL (ref 65–99)
GLUCOSE SERPL-MCNC: 123 MG/DL (ref 65–99)
GLUCOSE UR STRIP-MCNC: NEGATIVE MG/DL
HCT VFR BLD AUTO: 36.8 % (ref 34–46.6)
HCT VFR BLD AUTO: 37.9 % (ref 34–46.6)
HGB BLD-MCNC: 11.9 G/DL (ref 12–15.9)
HGB BLD-MCNC: 12.2 G/DL (ref 12–15.9)
HGB UR QL STRIP.AUTO: NEGATIVE
HYALINE CASTS UR QL AUTO: ABNORMAL /LPF
IMM GRANULOCYTES # BLD AUTO: 0.06 10*3/MM3 (ref 0–0.05)
IMM GRANULOCYTES NFR BLD AUTO: 0.6 % (ref 0–0.5)
KETONES UR QL STRIP: ABNORMAL
LDH SERPL-CCNC: 150 U/L (ref 135–214)
LDH SERPL-CCNC: 164 U/L (ref 135–214)
LEUKOCYTE ESTERASE UR QL STRIP.AUTO: ABNORMAL
LYMPHOCYTES # BLD AUTO: 2.6 10*3/MM3 (ref 0.7–3.1)
LYMPHOCYTES NFR BLD AUTO: 26.2 % (ref 19.6–45.3)
MCH RBC QN AUTO: 28 PG (ref 26.6–33)
MCH RBC QN AUTO: 28.1 PG (ref 26.6–33)
MCHC RBC AUTO-ENTMCNC: 32.2 G/DL (ref 31.5–35.7)
MCHC RBC AUTO-ENTMCNC: 32.3 G/DL (ref 31.5–35.7)
MCV RBC AUTO: 86.9 FL (ref 79–97)
MCV RBC AUTO: 87 FL (ref 79–97)
MONOCYTES # BLD AUTO: 0.67 10*3/MM3 (ref 0.1–0.9)
MONOCYTES NFR BLD AUTO: 6.8 % (ref 5–12)
NEUTROPHILS NFR BLD AUTO: 6.45 10*3/MM3 (ref 1.7–7)
NEUTROPHILS NFR BLD AUTO: 65 % (ref 42.7–76)
NITRITE UR QL STRIP: NEGATIVE
NRBC BLD AUTO-RTO: 0 /100 WBC (ref 0–0.2)
PH UR STRIP.AUTO: 5.5 [PH] (ref 5–8)
PLATELET # BLD AUTO: 221 10*3/MM3 (ref 140–450)
PLATELET # BLD AUTO: 222 10*3/MM3 (ref 140–450)
PMV BLD AUTO: 11.6 FL (ref 6–12)
PMV BLD AUTO: 11.9 FL (ref 6–12)
POTASSIUM SERPL-SCNC: 3.6 MMOL/L (ref 3.5–5.2)
POTASSIUM SERPL-SCNC: 3.9 MMOL/L (ref 3.5–5.2)
PROT SERPL-MCNC: 6.7 G/DL (ref 6–8.5)
PROT SERPL-MCNC: 7 G/DL (ref 6–8.5)
PROT UR QL STRIP: ABNORMAL
RBC # BLD AUTO: 4.23 10*6/MM3 (ref 3.77–5.28)
RBC # BLD AUTO: 4.36 10*6/MM3 (ref 3.77–5.28)
RBC # UR STRIP: ABNORMAL /HPF
REF LAB TEST METHOD: ABNORMAL
SODIUM SERPL-SCNC: 136 MMOL/L (ref 136–145)
SODIUM SERPL-SCNC: 140 MMOL/L (ref 136–145)
SP GR UR STRIP: >=1.03 (ref 1–1.03)
SQUAMOUS #/AREA URNS HPF: ABNORMAL /HPF
URATE SERPL-MCNC: 5.5 MG/DL (ref 2.4–5.7)
URATE SERPL-MCNC: 5.6 MG/DL (ref 2.4–5.7)
UROBILINOGEN UR QL STRIP: ABNORMAL
WBC # UR STRIP: ABNORMAL /HPF
WBC NRBC COR # BLD AUTO: 8.47 10*3/MM3 (ref 3.4–10.8)
WBC NRBC COR # BLD AUTO: 9.92 10*3/MM3 (ref 3.4–10.8)

## 2024-10-22 PROCEDURE — G0463 HOSPITAL OUTPT CLINIC VISIT: HCPCS

## 2024-10-22 PROCEDURE — 85025 COMPLETE CBC W/AUTO DIFF WBC: CPT | Performed by: OBSTETRICS & GYNECOLOGY

## 2024-10-22 PROCEDURE — 84550 ASSAY OF BLOOD/URIC ACID: CPT | Performed by: OBSTETRICS & GYNECOLOGY

## 2024-10-22 PROCEDURE — 59025 FETAL NON-STRESS TEST: CPT

## 2024-10-22 PROCEDURE — 83615 LACTATE (LD) (LDH) ENZYME: CPT | Performed by: OBSTETRICS & GYNECOLOGY

## 2024-10-22 PROCEDURE — 80053 COMPREHEN METABOLIC PANEL: CPT | Performed by: OBSTETRICS & GYNECOLOGY

## 2024-10-22 PROCEDURE — 81001 URINALYSIS AUTO W/SCOPE: CPT | Performed by: OBSTETRICS & GYNECOLOGY

## 2024-10-22 PROCEDURE — 85027 COMPLETE CBC AUTOMATED: CPT | Performed by: OBSTETRICS & GYNECOLOGY

## 2024-10-22 PROCEDURE — 36415 COLL VENOUS BLD VENIPUNCTURE: CPT | Performed by: OBSTETRICS & GYNECOLOGY

## 2024-10-22 PROCEDURE — 84156 ASSAY OF PROTEIN URINE: CPT | Performed by: OBSTETRICS & GYNECOLOGY

## 2024-10-22 PROCEDURE — 87086 URINE CULTURE/COLONY COUNT: CPT | Performed by: OBSTETRICS & GYNECOLOGY

## 2024-10-22 PROCEDURE — 82570 ASSAY OF URINE CREATININE: CPT | Performed by: OBSTETRICS & GYNECOLOGY

## 2024-10-22 RX ORDER — SODIUM CHLORIDE 0.9 % (FLUSH) 0.9 %
10 SYRINGE (ML) INJECTION AS NEEDED
Status: DISCONTINUED | OUTPATIENT
Start: 2024-10-22 | End: 2024-10-22 | Stop reason: HOSPADM

## 2024-10-22 NOTE — NON STRESS TEST
Angie Ding, a  at 36w0d with an GLORIA of 2024, by Ultrasound, was seen at [facility] LABOR DELIVERY for a nonstress test.    Chief Complaint   Patient presents with    Elevated Blood Pressure     PT HAS CHTN WITH PRE ECLAMPSIA. SENT FROM OB OFFICE FOR FURTHER EVALUATION DUE TO ELEVATED BP READINGS.     Headache     PT ALSO C/O HAVING HEADACHES OFF AND ON FOR THE PAST FEW DAYS.       Patient Active Problem List   Diagnosis    Pregnancy    Antepartum mild preeclampsia    37 weeks gestation of pregnancy    Failed medical induction of labor    Postpartum care following  delivery    HTN (hypertension)    Decreased fetal movement    Headache    Chronic hypertension affecting pregnancy       Start Time: 1325  Stop Time: 1345    Interpretation A  Nonstress Test Interpretation A: Reactive  Comments A: VERIFIED BY EDITH NULL RN

## 2024-10-22 NOTE — NON STRESS TEST
Angie Ding, a  at 36w0d with an GLORIA of 2024, by Ultrasound, was seen at [facility] LABOR DELIVERY for a nonstress test.    Chief Complaint   Patient presents with    Headache     PT PRESENTS FOR HEADACHE, VOMITING, BACK PAIN, AND ABD PAIN. FM+. PT DENIES VAG BLEEDING OF LOF.        Patient Active Problem List   Diagnosis    Pregnancy    Antepartum mild preeclampsia    37 weeks gestation of pregnancy    Failed medical induction of labor    Postpartum care following  delivery    HTN (hypertension)    Decreased fetal movement    Headache       Start Time: 0000  Stop Time: 0020    Interpretation A  Nonstress Test Interpretation A: Reactive  Comments A: VERIFIED BY TENNILLE JEFFRIES RN

## 2024-10-22 NOTE — NURSING NOTE
" 36w0d PRESENTS for headaches starting 10/21/2024. V/S recorded, reactive NST noted, PIH labs ordered, reviewed with Dr. Harris. Dr. Harris admits Pt for 24 hour observation and 24 hour urine. Pt educated on MD's orders. Pt states, \"My son has a doctor's appointment in the morning, I have an appointment with Dr. Harris today at 11 AM and an appointment in [place] on Thursday. I need to go home.\" RN educated Pt on S/S and risks of preeclampsia diagnosis, Pt verbalized understanding. Pt continued to voice that she will be seeing her doctor's this week. Pt stated, \"I am not putting myself in danger.\" Pt educated on risks of leaving AMA, Pt verbalized understanding. Dr. Harris made aware that Pt requested to leave AMA. AMA form signed.   "

## 2024-10-23 LAB
BACTERIA SPEC AEROBE CULT: NORMAL
CREAT UR-MCNC: 254.4 MG/DL
PROT ?TM UR-MCNC: 28.4 MG/DL
PROT/CREAT UR: 111.6 MG/G CREA (ref 0–200)

## (undated) DEVICE — ADHS SKIN PREMIERPRO EXOFIN TOPICAL HI/VISC .5ML

## (undated) DEVICE — TRY SPINE PENCAN 24GA X4IN

## (undated) DEVICE — HYDROGEL COATED LATEX URINE METER FOLEY TRAY,16 FR/CH (5.3 MM), 5 ML CATHETER PRE-CONNECTED TO 2000 ML DRAINAGE BAG WITH NEEDLE SAMPLING: Brand: DOVER

## (undated) DEVICE — APPL CHLORAPREP W/TINT 26ML ORNG

## (undated) DEVICE — PK C/SECT 70